# Patient Record
Sex: FEMALE | Race: WHITE | HISPANIC OR LATINO | Employment: FULL TIME | URBAN - METROPOLITAN AREA
[De-identification: names, ages, dates, MRNs, and addresses within clinical notes are randomized per-mention and may not be internally consistent; named-entity substitution may affect disease eponyms.]

---
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
## 2020-01-18 ENCOUNTER — HOSPITAL ENCOUNTER (EMERGENCY)
Facility: HOSPITAL | Age: 20
Discharge: HOME | End: 2020-01-18
Attending: EMERGENCY MEDICINE

## 2020-01-18 VITALS
WEIGHT: 148 LBS | OXYGEN SATURATION: 100 % | HEART RATE: 97 BPM | HEIGHT: 61 IN | DIASTOLIC BLOOD PRESSURE: 89 MMHG | SYSTOLIC BLOOD PRESSURE: 149 MMHG | RESPIRATION RATE: 18 BRPM | BODY MASS INDEX: 27.94 KG/M2 | TEMPERATURE: 98.7 F

## 2020-01-18 DIAGNOSIS — J06.9 URI (UPPER RESPIRATORY INFECTION): Primary | ICD-10-CM

## 2020-01-18 PROCEDURE — 99282 EMERGENCY DEPT VISIT SF MDM: CPT

## 2020-01-18 PROCEDURE — 99282 EMERGENCY DEPT VISIT SF MDM: CPT | Performed by: EMERGENCY MEDICINE

## 2020-01-18 RX ADMIN — DEXAMETHASONE SODIUM PHOSPHATE 10 MG: 10 INJECTION, SOLUTION INTRAMUSCULAR; INTRAVENOUS at 22:32

## 2020-01-19 NOTE — ED ATTENDING ATTESTATION
1/18/2020  IEileen DO, saw and evaluated the patient  I have discussed the patient with the resident/non-physician practitioner and agree with the resident's/non-physician practitioner's findings, Plan of Care, and MDM as documented in the resident's/non-physician practitioner's note, except where noted  All available labs and Radiology studies were reviewed  I was present for key portions of any procedure(s) performed by the resident/non-physician practitioner and I was immediately available to provide assistance  At this point I agree with the current assessment done in the Emergency Department  I have conducted an independent evaluation of this patient a history and physical is as follows:    80-year-old female presents with sore throat, cough, chest congestion, runny nose  No fevers or chills, denies shortness of breath  On exam-no acute distress, appears nontoxic, heart regular, no respiratory distress, mild pharyngeal erythema without tonsillar swelling  Plan-symptomatic treatment with Flonase, Decadron, Sudafed  Given return precautions including return if difficulty swallowing or other concerns      ED Course         Critical Care Time  Procedures

## 2020-01-19 NOTE — ED PROVIDER NOTES
History  Chief Complaint   Patient presents with    Sore Throat     sore throat, swollen glands     77-year-old female with no previous medical history, nonsmoker presenting emergency department with URI like symptoms for the last 4 days including cough, rhinorrhea, nasal congestion, chest congestion  No fevers, chills, shortness of breath, nausea, vomiting, diarrhea, constipation, vaginal discharge, muffled voice  None       History reviewed  No pertinent past medical history  History reviewed  No pertinent surgical history  History reviewed  No pertinent family history  I have reviewed and agree with the history as documented  Social History     Tobacco Use    Smoking status: Never Smoker    Smokeless tobacco: Never Used   Substance Use Topics    Alcohol use: Never     Frequency: Never    Drug use: Never        Review of Systems   HENT: Positive for congestion and sinus pressure  Respiratory: Positive for cough  Negative for shortness of breath  All other systems reviewed and are negative  Physical Exam  ED Triage Vitals [01/18/20 2146]   Temperature Pulse Respirations Blood Pressure SpO2   98 7 °F (37 1 °C) 97 18 149/89 100 %      Temp Source Heart Rate Source Patient Position - Orthostatic VS BP Location FiO2 (%)   Oral Monitor Lying Right arm --      Pain Score       8             Orthostatic Vital Signs  Vitals:    01/18/20 2146   BP: 149/89   Pulse: 97   Patient Position - Orthostatic VS: Lying       Physical Exam   Constitutional: She appears well-developed and well-nourished  No distress  HENT:   Head: Normocephalic and atraumatic  Right Ear: External ear normal    Left Ear: External ear normal    Eyes: Conjunctivae and EOM are normal    Neck: No JVD present  No tracheal deviation present  Cardiovascular: Normal rate, regular rhythm, normal heart sounds and intact distal pulses  No murmur heard  Pulmonary/Chest: Breath sounds normal  No stridor   No respiratory distress  She has no wheezes  She has no rales  Abdominal: Soft  Bowel sounds are normal  She exhibits no distension and no mass  There is no tenderness  There is no rebound and no guarding  Genitourinary:   Genitourinary Comments: Deferred   Musculoskeletal: She exhibits no edema, tenderness or deformity  Neurological: She exhibits normal muscle tone  Coordination normal    Skin: Skin is warm and dry  Capillary refill takes less than 2 seconds  No rash noted  She is not diaphoretic  No erythema  No pallor  Psychiatric: She has a normal mood and affect  Her behavior is normal  Judgment and thought content normal    Nursing note and vitals reviewed  ED Medications  Medications   dexamethasone 10 mg/mL oral liquid 10 mg 1 mL (10 mg Oral Given 1/18/20 2232)       Diagnostic Studies  Results Reviewed     None                 No orders to display         Procedures  Procedures      ED Course                               MDM  Number of Diagnoses or Management Options  URI (upper respiratory infection): new and does not require workup  Diagnosis management comments: 72-year-old female presenting emergency department with URI like symptoms  No erythema, tonsillar exudates, fever to suggest strep pharyngitis  No uvular deviation to suggest PTA  Patient likely has a URI  Patient be given 10 mg of Decadron to decrease her sore throat  Patient to treat her symptoms symptomatically  Patient given return precautions for shortness of breath, fevers  Patient expressed understanding with these return precautions      Risk of Complications, Morbidity, and/or Mortality  Presenting problems: minimal  Diagnostic procedures: minimal  Management options: minimal    Patient Progress  Patient progress: stable        Disposition  Final diagnoses:   URI (upper respiratory infection)     Time reflects when diagnosis was documented in both MDM as applicable and the Disposition within this note     Time User Action Codes Description Comment    1/18/2020 10:15 PM Froilan Estrella Add [J06 9] URI (upper respiratory infection)       ED Disposition     ED Disposition Condition Date/Time Comment    Discharge Stable Sat Jan 18, 2020 10:15 PM Griselda Romero discharge to home/self care  Follow-up Information     Follow up With Specialties Details Why Contact Info Additional [address] Emergency Department Emergency Medicine  If symptoms worsen [address]  [phone]  ED, [address] [place], [place], [address]   [phone]          Patient's Medications    No medications on file     No discharge procedures on file  ED Provider  Attending physically available and evaluated Liz Tasha LEVIN managed the patient along with the ED Attending      Electronically Signed by         Nicki Klein DO  01/18/20 5348

## 2020-01-19 NOTE — DISCHARGE INSTRUCTIONS
Please use Sudafed during the day for your cough and sinus congestion as needed  Use honey for your sore throat  Use Flonase for your sinus congestion  Use naproxen sodium if you have any body aches

## 2020-01-20 ENCOUNTER — HOSPITAL ENCOUNTER (EMERGENCY)
Facility: HOSPITAL | Age: 20
Discharge: HOME | End: 2020-01-21
Attending: EMERGENCY MEDICINE

## 2020-01-20 VITALS
WEIGHT: 154.98 LBS | RESPIRATION RATE: 18 BRPM | BODY MASS INDEX: 29.26 KG/M2 | TEMPERATURE: 99.1 F | SYSTOLIC BLOOD PRESSURE: 133 MMHG | DIASTOLIC BLOOD PRESSURE: 80 MMHG | HEART RATE: 105 BPM | OXYGEN SATURATION: 98 % | HEIGHT: 61 IN

## 2020-01-20 DIAGNOSIS — J02.9 PHARYNGITIS: Primary | ICD-10-CM

## 2020-01-20 DIAGNOSIS — J06.9 URI (UPPER RESPIRATORY INFECTION): ICD-10-CM

## 2020-01-20 PROCEDURE — 99284 EMERGENCY DEPT VISIT MOD MDM: CPT | Performed by: EMERGENCY MEDICINE

## 2020-01-20 PROCEDURE — 99282 EMERGENCY DEPT VISIT SF MDM: CPT

## 2020-01-20 PROCEDURE — 96372 THER/PROPH/DIAG INJ SC/IM: CPT

## 2020-01-20 RX ORDER — KETOROLAC TROMETHAMINE 30 MG/ML
15 INJECTION, SOLUTION INTRAMUSCULAR; INTRAVENOUS ONCE
Status: COMPLETED | OUTPATIENT
Start: 2020-01-20 | End: 2020-01-20

## 2020-01-20 RX ORDER — ACETAMINOPHEN 325 MG/1
975 TABLET ORAL ONCE
Status: COMPLETED | OUTPATIENT
Start: 2020-01-20 | End: 2020-01-20

## 2020-01-20 RX ADMIN — KETOROLAC TROMETHAMINE 15 MG: 30 INJECTION, SOLUTION INTRAMUSCULAR at 23:44

## 2020-01-20 RX ADMIN — ACETAMINOPHEN 975 MG: 325 TABLET ORAL at 23:44

## 2020-01-20 RX ADMIN — DEXAMETHASONE SODIUM PHOSPHATE 10 MG: 10 INJECTION, SOLUTION INTRAMUSCULAR; INTRAVENOUS at 23:44

## 2020-01-21 NOTE — ED PROVIDER NOTES
History  Chief Complaint   Patient presents with    Sore Throat     Pt reports sore throat for four days as well as a cough      17-year-old female otherwise healthy presents emergency department for sore throat and cough  Patient says that symptoms started yesterday and she has also had subjective fevers, bilateral paraspinous neck pain, rhinorrhea  Her boyfriend is ill with similar symptoms  She has not had any shortness of breath, chest pain, abdominal pain, nausea, vomiting, diarrhea, neck stiffness, focal neurological symptoms, urinary symptoms, any other symptoms or complaints  None       History reviewed  No pertinent past medical history  History reviewed  No pertinent surgical history  History reviewed  No pertinent family history  I have reviewed and agree with the history as documented  Social History     Tobacco Use    Smoking status: Never Smoker    Smokeless tobacco: Never Used   Substance Use Topics    Alcohol use: Never     Frequency: Never    Drug use: Never        Review of Systems   Constitutional: Positive for chills  HENT: Positive for rhinorrhea and sore throat  Eyes: Negative  Respiratory: Positive for cough  Negative for shortness of breath  Cardiovascular: Negative  Negative for chest pain and leg swelling  Gastrointestinal: Negative  Negative for abdominal pain, diarrhea, nausea and vomiting  Genitourinary: Negative  Negative for dysuria, flank pain and frequency  Musculoskeletal: Negative  Negative for back pain and neck pain  Skin: Negative  Negative for rash  Neurological: Negative  Negative for light-headedness and headaches  All other systems reviewed and are negative        Physical Exam  ED Triage Vitals [01/20/20 2314]   Temperature Pulse Respirations Blood Pressure SpO2   99 1 °F (37 3 °C) 105 18 133/80 98 %      Temp Source Heart Rate Source Patient Position - Orthostatic VS BP Location FiO2 (%)   Oral Monitor Sitting Left arm --      Pain Score       Worst Possible Pain             Orthostatic Vital Signs  Vitals:    01/20/20 2314   BP: 133/80   Pulse: 105   Patient Position - Orthostatic VS: Sitting       Physical Exam   Constitutional: She is oriented to person, place, and time  She appears well-developed and well-nourished  No distress  HENT:   Head: Normocephalic and atraumatic  Mouth/Throat: Uvula is midline and mucous membranes are normal  No uvula swelling  Posterior oropharyngeal erythema (Mild) present  No posterior oropharyngeal edema or tonsillar abscesses  Eyes: EOM are normal    Neck: Normal range of motion  Neck supple  Cardiovascular: Normal rate, regular rhythm, normal heart sounds and intact distal pulses  Exam reveals no gallop and no friction rub  No murmur heard  Pulmonary/Chest: Effort normal and breath sounds normal  No respiratory distress  She has no wheezes  She has no rales  Abdominal: Soft  There is no tenderness  There is no rebound and no guarding  Musculoskeletal: She exhibits no edema or tenderness  Neurological: She is alert and oriented to person, place, and time  No cranial nerve deficit  Clear fluent speech   Skin: Skin is warm and dry  Capillary refill takes less than 2 seconds  Psychiatric: She has a normal mood and affect  Nursing note and vitals reviewed        ED Medications  Medications   ketorolac (TORADOL) injection 15 mg (15 mg Intramuscular Given 1/20/20 2344)   dexamethasone 10 mg/mL oral liquid 10 mg 1 mL (10 mg Oral Given 1/20/20 2344)   acetaminophen (TYLENOL) tablet 975 mg (975 mg Oral Given 1/20/20 2344)       Diagnostic Studies  Results Reviewed     None                 No orders to display         Procedures  Procedures      ED Course                               MDM  Number of Diagnoses or Management Options  Pharyngitis:   URI (upper respiratory infection):   Diagnosis management comments: 57-year-old female otherwise healthy presents emergency department for sore throat and cough  No signs of strep throat  Gave Decadron, Tylenol, Toradol for pain control  Advised close primary care follow-up  Strict ED return precautions provided should symptoms worsen and patient can otherwise follow up outpatient  Patient expresses an understanding and agreement with the plan and remains in good condition for discharge  Disposition  Final diagnoses:   Pharyngitis   URI (upper respiratory infection)     Time reflects when diagnosis was documented in both MDM as applicable and the Disposition within this note     Time User Action Codes Description Comment    1/20/2020 11:46 PM Rachell Patino Add [J02 9] Pharyngitis     1/20/2020 11:46 PM Rachell Patino Add [J06 9] URI (upper respiratory infection)       ED Disposition     ED Disposition Condition Date/Time Comment    Discharge Stable Mon Jan 20, 2020 11:46 PM Vasile Ferguson discharge to home/self care  Follow-up Information     Follow up With Specialties Details Why Contact Info Additional [address] Emergency Department Emergency Medicine Go to  If symptoms worsen [address]  [phone]  ED, [address] [place] [address], [address] [place] [address] Avenue  Call in 1 day To make an appointment [phone]             Patient's Medications    No medications on file     No discharge procedures on file  ED Provider  Attending physically available and evaluated Vasile Ferguson I managed the patient along with the ED Attending      Electronically Signed by         Del Lindsey MD  01/21/20 0010

## 2020-01-26 NOTE — ED ATTENDING ATTESTATION
1/20/2020  IKoffi MD, saw and evaluated the patient  I have discussed the patient with the resident/non-physician practitioner and agree with the resident's/non-physician practitioner's findings, Plan of Care, and MDM as documented in the resident's/non-physician practitioner's note, except where noted  All available labs and Radiology studies were reviewed  I was present for key portions of any procedure(s) performed by the resident/non-physician practitioner and I was immediately available to provide assistance  At this point I agree with the current assessment done in the Emergency Department  I have conducted an independent evaluation of this patient a history and physical is as follows:    27-year-old female presenting with sore throat and URI symptoms  Awake and alert no acute distress  Head atraumatic normocephalic  Neck is supple with no meningismus  Oropharynx injected with no exudate  Heart regular rate and rhythm, no murmurs rubs or gallops  Lungs clear to auscultation bilaterally  Will treat symptomatically and instruct return precautions      ED Course         Critical Care Time  Procedures

## 2020-11-13 ENCOUNTER — OFFICE VISIT (OUTPATIENT)
Dept: URGENT CARE | Age: 20
End: 2020-11-13
Payer: OTHER GOVERNMENT

## 2020-11-13 VITALS
OXYGEN SATURATION: 100 % | WEIGHT: 155 LBS | RESPIRATION RATE: 16 BRPM | HEART RATE: 102 BPM | BODY MASS INDEX: 29.27 KG/M2 | TEMPERATURE: 97.1 F | HEIGHT: 61 IN

## 2020-11-13 DIAGNOSIS — Z20.822 ENCOUNTER FOR SCREENING LABORATORY TESTING FOR COVID-19 VIRUS: Primary | ICD-10-CM

## 2020-11-13 PROCEDURE — U0003 INFECTIOUS AGENT DETECTION BY NUCLEIC ACID (DNA OR RNA); SEVERE ACUTE RESPIRATORY SYNDROME CORONAVIRUS 2 (SARS-COV-2) (CORONAVIRUS DISEASE [COVID-19]), AMPLIFIED PROBE TECHNIQUE, MAKING USE OF HIGH THROUGHPUT TECHNOLOGIES AS DESCRIBED BY CMS-2020-01-R: HCPCS | Performed by: PREVENTIVE MEDICINE

## 2020-11-13 PROCEDURE — G0382 LEV 3 HOSP TYPE B ED VISIT: HCPCS | Performed by: PREVENTIVE MEDICINE

## 2020-11-15 LAB — SARS-COV-2 RNA SPEC QL NAA+PROBE: NOT DETECTED

## 2020-11-16 ENCOUNTER — TELEPHONE (OUTPATIENT)
Dept: URGENT CARE | Facility: CLINIC | Age: 20
End: 2020-11-16

## 2021-05-19 ENCOUNTER — APPOINTMENT (EMERGENCY)
Dept: RADIOLOGY | Facility: HOSPITAL | Age: 21
End: 2021-05-19
Payer: COMMERCIAL

## 2021-05-19 ENCOUNTER — HOSPITAL ENCOUNTER (EMERGENCY)
Facility: HOSPITAL | Age: 21
Discharge: HOME | End: 2021-05-19
Attending: EMERGENCY MEDICINE
Payer: COMMERCIAL

## 2021-05-19 VITALS
TEMPERATURE: 98.4 F | RESPIRATION RATE: 16 BRPM | OXYGEN SATURATION: 100 % | DIASTOLIC BLOOD PRESSURE: 93 MMHG | HEART RATE: 87 BPM | SYSTOLIC BLOOD PRESSURE: 138 MMHG

## 2021-05-19 DIAGNOSIS — G43.909 MIGRAINE: Primary | ICD-10-CM

## 2021-05-19 LAB
ALBUMIN SERPL BCP-MCNC: 4.2 G/DL (ref 3.5–5)
ALP SERPL-CCNC: 80 U/L (ref 46–116)
ALT SERPL W P-5'-P-CCNC: 20 U/L (ref 12–78)
ANION GAP SERPL CALCULATED.3IONS-SCNC: 6 MMOL/L (ref 4–13)
AST SERPL W P-5'-P-CCNC: 19 U/L (ref 5–45)
BASOPHILS # BLD AUTO: 0.04 THOUSANDS/ΜL (ref 0–0.1)
BASOPHILS NFR BLD AUTO: 1 % (ref 0–1)
BILIRUB SERPL-MCNC: 0.38 MG/DL (ref 0.2–1)
BILIRUB UR QL STRIP: NEGATIVE
BUN SERPL-MCNC: 12 MG/DL (ref 5–25)
CALCIUM SERPL-MCNC: 9.4 MG/DL (ref 8.3–10.1)
CHLORIDE SERPL-SCNC: 103 MMOL/L (ref 100–108)
CLARITY UR: CLEAR
CO2 SERPL-SCNC: 27 MMOL/L (ref 21–32)
COLOR UR: YELLOW
COLOR, POC: NORMAL
CREAT SERPL-MCNC: 0.63 MG/DL (ref 0.6–1.3)
EOSINOPHIL # BLD AUTO: 0.1 THOUSAND/ΜL (ref 0–0.61)
EOSINOPHIL NFR BLD AUTO: 2 % (ref 0–6)
ERYTHROCYTE [DISTWIDTH] IN BLOOD BY AUTOMATED COUNT: 13.3 % (ref 11.6–15.1)
EXT PREG TEST URINE: NEGATIVE
EXT. CONTROL ED NAV: NORMAL
GFR SERPL CREATININE-BSD FRML MDRD: 129 ML/MIN/1.73SQ M
GLUCOSE SERPL-MCNC: 85 MG/DL (ref 65–140)
GLUCOSE UR STRIP-MCNC: NEGATIVE MG/DL
HCT VFR BLD AUTO: 45.3 % (ref 34.8–46.1)
HGB BLD-MCNC: 14.4 G/DL (ref 11.5–15.4)
HGB UR QL STRIP.AUTO: NEGATIVE
IMM GRANULOCYTES # BLD AUTO: 0.01 THOUSAND/UL (ref 0–0.2)
IMM GRANULOCYTES NFR BLD AUTO: 0 % (ref 0–2)
KETONES UR STRIP-MCNC: NEGATIVE MG/DL
LEUKOCYTE ESTERASE UR QL STRIP: NEGATIVE
LYMPHOCYTES # BLD AUTO: 1.78 THOUSANDS/ΜL (ref 0.6–4.47)
LYMPHOCYTES NFR BLD AUTO: 27 % (ref 14–44)
MCH RBC QN AUTO: 27.5 PG (ref 26.8–34.3)
MCHC RBC AUTO-ENTMCNC: 31.8 G/DL (ref 31.4–37.4)
MCV RBC AUTO: 87 FL (ref 82–98)
MONOCYTES # BLD AUTO: 0.45 THOUSAND/ΜL (ref 0.17–1.22)
MONOCYTES NFR BLD AUTO: 7 % (ref 4–12)
NEUTROPHILS # BLD AUTO: 4.26 THOUSANDS/ΜL (ref 1.85–7.62)
NEUTS SEG NFR BLD AUTO: 63 % (ref 43–75)
NITRITE UR QL STRIP: NEGATIVE
NRBC BLD AUTO-RTO: 0 /100 WBCS
PH UR STRIP.AUTO: 6 [PH] (ref 4.5–8)
PLATELET # BLD AUTO: 247 THOUSANDS/UL (ref 149–390)
PMV BLD AUTO: 12.1 FL (ref 8.9–12.7)
POTASSIUM SERPL-SCNC: 3.8 MMOL/L (ref 3.5–5.3)
PROT SERPL-MCNC: 8.9 G/DL (ref 6.4–8.2)
PROT UR STRIP-MCNC: NEGATIVE MG/DL
RBC # BLD AUTO: 5.23 MILLION/UL (ref 3.81–5.12)
SODIUM SERPL-SCNC: 136 MMOL/L (ref 136–145)
SP GR UR STRIP.AUTO: 1.02 (ref 1–1.03)
UROBILINOGEN UR QL STRIP.AUTO: 0.2 E.U./DL
WBC # BLD AUTO: 6.64 THOUSAND/UL (ref 4.31–10.16)

## 2021-05-19 PROCEDURE — 70450 CT HEAD/BRAIN W/O DYE: CPT

## 2021-05-19 PROCEDURE — 85025 COMPLETE CBC W/AUTO DIFF WBC: CPT | Performed by: PHYSICIAN ASSISTANT

## 2021-05-19 PROCEDURE — 80053 COMPREHEN METABOLIC PANEL: CPT | Performed by: PHYSICIAN ASSISTANT

## 2021-05-19 PROCEDURE — 99284 EMERGENCY DEPT VISIT MOD MDM: CPT

## 2021-05-19 PROCEDURE — 99284 EMERGENCY DEPT VISIT MOD MDM: CPT | Performed by: PHYSICIAN ASSISTANT

## 2021-05-19 PROCEDURE — 96361 HYDRATE IV INFUSION ADD-ON: CPT

## 2021-05-19 PROCEDURE — 81025 URINE PREGNANCY TEST: CPT | Performed by: PHYSICIAN ASSISTANT

## 2021-05-19 PROCEDURE — 36415 COLL VENOUS BLD VENIPUNCTURE: CPT | Performed by: PHYSICIAN ASSISTANT

## 2021-05-19 PROCEDURE — 96375 TX/PRO/DX INJ NEW DRUG ADDON: CPT

## 2021-05-19 PROCEDURE — 81003 URINALYSIS AUTO W/O SCOPE: CPT

## 2021-05-19 PROCEDURE — 96374 THER/PROPH/DIAG INJ IV PUSH: CPT

## 2021-05-19 RX ORDER — DIPHENHYDRAMINE HYDROCHLORIDE 50 MG/ML
25 INJECTION INTRAMUSCULAR; INTRAVENOUS ONCE
Status: COMPLETED | OUTPATIENT
Start: 2021-05-19 | End: 2021-05-19

## 2021-05-19 RX ORDER — METOCLOPRAMIDE HYDROCHLORIDE 5 MG/ML
10 INJECTION INTRAMUSCULAR; INTRAVENOUS ONCE
Status: COMPLETED | OUTPATIENT
Start: 2021-05-19 | End: 2021-05-19

## 2021-05-19 RX ORDER — ACETAMINOPHEN 325 MG/1
650 TABLET ORAL ONCE
Status: COMPLETED | OUTPATIENT
Start: 2021-05-19 | End: 2021-05-19

## 2021-05-19 RX ADMIN — METOCLOPRAMIDE 10 MG: 5 INJECTION, SOLUTION INTRAMUSCULAR; INTRAVENOUS at 11:13

## 2021-05-19 RX ADMIN — SODIUM CHLORIDE 1000 ML: 0.9 INJECTION, SOLUTION INTRAVENOUS at 11:13

## 2021-05-19 RX ADMIN — ACETAMINOPHEN 650 MG: 325 TABLET, FILM COATED ORAL at 11:13

## 2021-05-19 RX ADMIN — DIPHENHYDRAMINE HYDROCHLORIDE 25 MG: 50 INJECTION, SOLUTION INTRAMUSCULAR; INTRAVENOUS at 11:12

## 2021-05-19 NOTE — ED PROVIDER NOTES
History  Chief Complaint   Patient presents with    Headache - Recurrent or Known Dx Migraines     Pt presents with migraine HA since waking up this morning  Also c/o photophobia and nausea  Patient presents to the ER for evaluation of a migraine  Patient states that when she woke up this morning she had a significant left-sided migraine  States that she has a history of migraines and gets them frequently however this 1 is different and more severe than her normal migraines  States that she took 400 mg ibuprofen around 4 hours PTA with no relief  States the headache is worse with lights and sounds  Denies any head trauma  States that she has never had imaging on her head and does not follow up with anybody for her migraines  Patient denies any known sick contacts  Denies any fevers, dizziness, syncope, chest pain, shortness of breath, cough, nausea, vomiting, diarrhea, or any other concerning symptoms  None       History reviewed  No pertinent past medical history  History reviewed  No pertinent surgical history  History reviewed  No pertinent family history  I have reviewed and agree with the history as documented  E-Cigarette/Vaping     E-Cigarette/Vaping Substances     Social History     Tobacco Use    Smoking status: Never Smoker    Smokeless tobacco: Never Used   Substance Use Topics    Alcohol use: Never     Frequency: Never    Drug use: Never       Review of Systems   Constitutional: Negative for fever  HENT: Negative for congestion, rhinorrhea and sore throat  Eyes: Positive for photophobia  Respiratory: Negative for shortness of breath  Cardiovascular: Negative for chest pain  Gastrointestinal: Negative for abdominal pain, nausea and vomiting  Genitourinary: Negative for dysuria  Musculoskeletal: Negative for back pain and neck pain  Skin: Negative for rash  Neurological: Positive for headaches  Negative for weakness and numbness     All other systems reviewed and are negative  Physical Exam  Physical Exam  Constitutional:       Appearance: She is well-developed  HENT:      Head: Normocephalic and atraumatic  Nose: Nose normal    Eyes:      Extraocular Movements: Extraocular movements intact  Conjunctiva/sclera: Conjunctivae normal       Pupils: Pupils are equal, round, and reactive to light  Comments: No nystagmus   Neck:      Musculoskeletal: Normal range of motion  No neck rigidity  Cardiovascular:      Rate and Rhythm: Normal rate  Pulmonary:      Effort: Pulmonary effort is normal    Abdominal:      Palpations: Abdomen is soft  Tenderness: There is no abdominal tenderness  Musculoskeletal: Normal range of motion  Comments: 5/5 strength of upper and lower extremities bilaterally   Skin:     General: Skin is warm  Capillary Refill: Capillary refill takes less than 2 seconds  Neurological:      Mental Status: She is alert and oriented to person, place, and time  Sensory: No sensory deficit  Comments: GCS:  15/15  Normal finger-to-nose  Psychiatric:         Mood and Affect: Mood normal          Behavior: Behavior normal          Thought Content:  Thought content normal          Judgment: Judgment normal          Vital Signs  ED Triage Vitals   Temperature Pulse Respirations Blood Pressure SpO2   05/19/21 1029 05/19/21 1029 05/19/21 1029 05/19/21 1029 05/19/21 1029   98 4 °F (36 9 °C) 87 16 138/93 100 %      Temp Source Heart Rate Source Patient Position - Orthostatic VS BP Location FiO2 (%)   05/19/21 1029 -- -- -- --   Oral          Pain Score       05/19/21 1106       Worst Possible Pain           Vitals:    05/19/21 1029   BP: 138/93   Pulse: 87         Visual Acuity  Visual Acuity      Most Recent Value   L Pupil Size (mm)  3   R Pupil Size (mm)  3          ED Medications  Medications   sodium chloride 0 9 % bolus 1,000 mL (0 mL Intravenous Stopped 5/19/21 1230)   acetaminophen (TYLENOL) tablet 650 mg (650 mg Oral Given 5/19/21 1113)   metoclopramide (REGLAN) injection 10 mg (10 mg Intravenous Given 5/19/21 1113)   diphenhydrAMINE (BENADRYL) injection 25 mg (25 mg Intravenous Given 5/19/21 1112)       Diagnostic Studies  Results Reviewed     Procedure Component Value Units Date/Time    Comprehensive metabolic panel [[id number]]  (Abnormal) Collected: 05/19/21 1112    Lab Status: Final result Specimen: Blood from Arm, Right Updated: 05/19/21 1208     Sodium 136 mmol/L      Potassium 3 8 mmol/L      Chloride 103 mmol/L      CO2 27 mmol/L      ANION GAP 6 mmol/L      BUN 12 mg/dL      Creatinine 0 63 mg/dL      Glucose 85 mg/dL      Calcium 9 4 mg/dL      AST 19 U/L      ALT 20 U/L      Alkaline Phosphatase 80 U/L      Total Protein 8 9 g/dL      Albumin 4 2 g/dL      Total Bilirubin 0 38 mg/dL      eGFR 129 ml/min/1 73sq m     Narrative:      National Kidney Disease Foundation guidelines for Chronic Kidney Disease (CKD):     Stage 1 with normal or high GFR (GFR > 90 mL/min/1 73 square meters)    Stage 2 Mild CKD (GFR = 60-89 mL/min/1 73 square meters)    Stage 3A Moderate CKD (GFR = 45-59 mL/min/1 73 square meters)    Stage 3B Moderate CKD (GFR = 30-44 mL/min/1 73 square meters)    Stage 4 Severe CKD (GFR = 15-29 mL/min/1 73 square meters)    Stage 5 End Stage CKD (GFR <15 mL/min/1 73 square meters)  Note: GFR calculation is accurate only with a steady state creatinine    CBC and differential [[id number]]  (Abnormal) Collected: 05/19/21 1112    Lab Status: Final result Specimen: Blood from Arm, Right Updated: 05/19/21 1122     WBC 6 64 Thousand/uL      RBC 5 23 Million/uL      Hemoglobin 14 4 g/dL      Hematocrit 45 3 %      MCV 87 fL      MCH 27 5 pg      MCHC 31 8 g/dL      RDW 13 3 %      MPV 12 1 fL      Platelets 080 Thousands/uL      nRBC 0 /100 WBCs      Neutrophils Relative 63 %      Immat GRANS % 0 %      Lymphocytes Relative 27 %      Monocytes Relative 7 %      Eosinophils Relative 2 %      Basophils Relative 1 %      Neutrophils Absolute 4 26 Thousands/µL      Immature Grans Absolute 0 01 Thousand/uL      Lymphocytes Absolute 1 78 Thousands/µL      Monocytes Absolute 0 45 Thousand/µL      Eosinophils Absolute 0 10 Thousand/µL      Basophils Absolute 0 04 Thousands/µL     Urine Macroscopic, POC [[id number]] Collected: 05/19/21 1115    Lab Status: Final result Specimen: Urine Updated: 05/19/21 1118     Color, UA Yellow     Clarity, UA Clear     pH, UA 6 0     Leukocytes, UA Negative     Nitrite, UA Negative     Protein, UA Negative mg/dl      Glucose, UA Negative mg/dl      Ketones, UA Negative mg/dl      Urobilinogen, UA 0 2 E U /dl      Bilirubin, UA Negative     Blood, UA Negative     Specific Gravity, UA 1 020    Narrative:      CLINITEK RESULT    POCT urinalysis dipstick [[id number]]  (Normal) Resulted: 05/19/21 1117    Lab Status: Final result Specimen: Urine Updated: 05/19/21 1118     Color, UA -    POCT pregnancy, urine [[id number]]  (Normal) Resulted: 05/19/21 1117    Lab Status: Final result Updated: 05/19/21 1117     EXT PREG TEST UR (Ref: Negative) Negative     Control Valid                 CT head without contrast   Final Result by Tung Oropeza MD (05/19 1206)      No acute intracranial abnormality  Workstation performed: [id number]                    Procedures  Procedures         ED Course  ED Course as of May 19 1543   Wed May 19, 2021   1040 Blood Pressure: 138/93   1040 Temperature: 98 4 °F (36 9 °C)   1040 Pulse: 87   1040 Respirations: 16   1040 SpO2: 100 %   1127 WBC: 6 64   1128 Hemoglobin: 14 4   1128 PREGNANCY TEST URINE: Negative   1128 Leukocytes, UA: Negative   1128 Nitrite, UA: Negative   1128 Blood, UA: Negative   1208 IMPRESSION:     No acute intracranial abnormality  CT head without contrast   1208 Creatinine: 0 63   1227 Patient notes resolution of headache with medication in the ER  Resting comfortably on re-examination  MDM     Labs and imaging grossly benign  Patient with resolution of symptoms with medication in the ER  Neurologically intact throughout entire ER stay  Discussed symptomatic treatment and strict return instructions  Patient in no acute distress throughout ER stay  Vitals stable and reassuring  Patient stable for discharge at this time  Reviewed plan with patient/family  Reviewed red flag symptoms and strict return instructions  Patient/family voiced understanding and agreement to plan  Patient/family had opportunity to ask questions and all questions were answered at bedside  Disposition  Final diagnoses:   Migraine     Time reflects when diagnosis was documented in both MDM as applicable and the Disposition within this note     Time User Action Codes Description Comment    5/19/2021 12:28 PM Ann Shen [I96 [id number]] Migraine       ED Disposition     ED Disposition Condition Date/Time Comment    Discharge Stable Wed May 19, 2021 12:28 PM Vasile Ferguson discharge to home/self care  Follow-up Information     Follow up With Specialties Details Why Contact Info Additional [address] Emergency Department Emergency Medicine  If symptoms worsen [address] [place] [address] Emergency Department, [address], [place], [facility] [address]    [address]        Neurology [facility] Neurology  As discussed follow up for your frequent migraines [facility] [address] [facility]  [phone] Neurology [facility], [address] [place] [address], [address] [place] [address]          There are no discharge medications for this patient  No discharge procedures on file      PDMP Review     None          ED Provider  Electronically Signed by           Blanco Frey Kathy Bhatti PA-C  05/19/21 1546

## 2021-05-19 NOTE — Clinical Note
Najma Cary was seen and treated in our emergency department on 5/19/2021  Diagnosis:     Crystal  is off the rest of the shift today  She may return on this date: If you have any questions or concerns, please don't hesitate to call        Yoshi Marie PA-C    ______________________________           _______________          _______________  Hospital Representative                              Date                                Time

## 2021-05-19 NOTE — DISCHARGE INSTRUCTIONS
Return to the ER with any new or worsening of symptoms such as but not limited to increased pain, fevers, dizziness, change in mental status, vomiting, chest pain, or any other concerning symptoms    Thank you for allowing us to be part of your care today

## 2021-07-19 ENCOUNTER — HOSPITAL ENCOUNTER (EMERGENCY)
Facility: HOSPITAL | Age: 21
Discharge: HOME | End: 2021-07-19
Attending: EMERGENCY MEDICINE | Admitting: EMERGENCY MEDICINE
Payer: COMMERCIAL

## 2021-07-19 ENCOUNTER — APPOINTMENT (EMERGENCY)
Dept: RADIOLOGY | Facility: HOSPITAL | Age: 21
End: 2021-07-19
Payer: COMMERCIAL

## 2021-07-19 VITALS
DIASTOLIC BLOOD PRESSURE: 80 MMHG | OXYGEN SATURATION: 98 % | WEIGHT: 177.25 LBS | HEART RATE: 85 BPM | SYSTOLIC BLOOD PRESSURE: 130 MMHG | HEIGHT: 61 IN | TEMPERATURE: 98.6 F | BODY MASS INDEX: 33.47 KG/M2 | RESPIRATION RATE: 16 BRPM

## 2021-07-19 DIAGNOSIS — R10.11 RIGHT UPPER QUADRANT ABDOMINAL PAIN: Primary | ICD-10-CM

## 2021-07-19 LAB
ALBUMIN SERPL BCP-MCNC: 3.8 G/DL (ref 3.5–5)
ALP SERPL-CCNC: 70 U/L (ref 46–116)
ALT SERPL W P-5'-P-CCNC: 32 U/L (ref 12–78)
ANION GAP SERPL CALCULATED.3IONS-SCNC: 5 MMOL/L (ref 4–13)
AST SERPL W P-5'-P-CCNC: 18 U/L (ref 5–45)
BASOPHILS # BLD AUTO: 0.04 THOUSANDS/ΜL (ref 0–0.1)
BASOPHILS NFR BLD AUTO: 1 % (ref 0–1)
BILIRUB SERPL-MCNC: 0.28 MG/DL (ref 0.2–1)
BILIRUB UR QL STRIP: NEGATIVE
BILIRUB UR QL STRIP: NEGATIVE
BUN SERPL-MCNC: 11 MG/DL (ref 5–25)
CALCIUM SERPL-MCNC: 8.8 MG/DL (ref 8.3–10.1)
CHLORIDE SERPL-SCNC: 108 MMOL/L (ref 100–108)
CLARITY UR: CLEAR
CLARITY UR: CLEAR
CO2 SERPL-SCNC: 23 MMOL/L (ref 21–32)
COLOR UR: YELLOW
COLOR UR: YELLOW
CREAT SERPL-MCNC: 0.65 MG/DL (ref 0.6–1.3)
EOSINOPHIL # BLD AUTO: 0.04 THOUSAND/ΜL (ref 0–0.61)
EOSINOPHIL NFR BLD AUTO: 1 % (ref 0–6)
ERYTHROCYTE [DISTWIDTH] IN BLOOD BY AUTOMATED COUNT: 13.6 % (ref 11.6–15.1)
EXT PREG TEST URINE: NEGATIVE
EXT. CONTROL ED NAV: NORMAL
GFR SERPL CREATININE-BSD FRML MDRD: 127 ML/MIN/1.73SQ M
GLUCOSE SERPL-MCNC: 92 MG/DL (ref 65–140)
GLUCOSE UR STRIP-MCNC: NEGATIVE MG/DL
GLUCOSE UR STRIP-MCNC: NEGATIVE MG/DL
HCT VFR BLD AUTO: 41.6 % (ref 34.8–46.1)
HGB BLD-MCNC: 13.4 G/DL (ref 11.5–15.4)
HGB UR QL STRIP.AUTO: NEGATIVE
HGB UR QL STRIP.AUTO: NEGATIVE
IMM GRANULOCYTES # BLD AUTO: 0.02 THOUSAND/UL (ref 0–0.2)
IMM GRANULOCYTES NFR BLD AUTO: 0 % (ref 0–2)
KETONES UR STRIP-MCNC: NEGATIVE MG/DL
KETONES UR STRIP-MCNC: NEGATIVE MG/DL
LEUKOCYTE ESTERASE UR QL STRIP: NEGATIVE
LEUKOCYTE ESTERASE UR QL STRIP: NEGATIVE
LYMPHOCYTES # BLD AUTO: 1.95 THOUSANDS/ΜL (ref 0.6–4.47)
LYMPHOCYTES NFR BLD AUTO: 29 % (ref 14–44)
MCH RBC QN AUTO: 27.6 PG (ref 26.8–34.3)
MCHC RBC AUTO-ENTMCNC: 32.2 G/DL (ref 31.4–37.4)
MCV RBC AUTO: 86 FL (ref 82–98)
MONOCYTES # BLD AUTO: 0.52 THOUSAND/ΜL (ref 0.17–1.22)
MONOCYTES NFR BLD AUTO: 8 % (ref 4–12)
NEUTROPHILS # BLD AUTO: 4.09 THOUSANDS/ΜL (ref 1.85–7.62)
NEUTS SEG NFR BLD AUTO: 61 % (ref 43–75)
NITRITE UR QL STRIP: NEGATIVE
NITRITE UR QL STRIP: NEGATIVE
NRBC BLD AUTO-RTO: 0 /100 WBCS
PH UR STRIP.AUTO: 6 [PH]
PH UR STRIP.AUTO: 6 [PH] (ref 4.5–8)
PLATELET # BLD AUTO: 239 THOUSANDS/UL (ref 149–390)
PMV BLD AUTO: 11.3 FL (ref 8.9–12.7)
POTASSIUM SERPL-SCNC: 4 MMOL/L (ref 3.5–5.3)
PROT SERPL-MCNC: 8.2 G/DL (ref 6.4–8.2)
PROT UR STRIP-MCNC: NEGATIVE MG/DL
PROT UR STRIP-MCNC: NEGATIVE MG/DL
RBC # BLD AUTO: 4.86 MILLION/UL (ref 3.81–5.12)
SODIUM SERPL-SCNC: 136 MMOL/L (ref 136–145)
SP GR UR STRIP.AUTO: 1.02 (ref 1–1.03)
SP GR UR STRIP.AUTO: >=1.03 (ref 1–1.03)
UROBILINOGEN UR QL STRIP.AUTO: 0.2 E.U./DL
UROBILINOGEN UR QL STRIP.AUTO: 0.2 E.U./DL
WBC # BLD AUTO: 6.66 THOUSAND/UL (ref 4.31–10.16)

## 2021-07-19 PROCEDURE — 81025 URINE PREGNANCY TEST: CPT

## 2021-07-19 PROCEDURE — 76856 US EXAM PELVIC COMPLETE: CPT

## 2021-07-19 PROCEDURE — 81003 URINALYSIS AUTO W/O SCOPE: CPT

## 2021-07-19 PROCEDURE — 80053 COMPREHEN METABOLIC PANEL: CPT

## 2021-07-19 PROCEDURE — 76830 TRANSVAGINAL US NON-OB: CPT

## 2021-07-19 PROCEDURE — 99284 EMERGENCY DEPT VISIT MOD MDM: CPT

## 2021-07-19 PROCEDURE — 99284 EMERGENCY DEPT VISIT MOD MDM: CPT | Performed by: EMERGENCY MEDICINE

## 2021-07-19 PROCEDURE — 36415 COLL VENOUS BLD VENIPUNCTURE: CPT

## 2021-07-19 PROCEDURE — 85025 COMPLETE CBC W/AUTO DIFF WBC: CPT

## 2021-07-19 RX ORDER — DICYCLOMINE HCL 20 MG
20 TABLET ORAL 2 TIMES DAILY
Qty: 6 TABLET | Refills: 0 | Status: SHIPPED | OUTPATIENT
Start: 2021-07-19 | End: 2021-08-02

## 2021-07-19 RX ORDER — SUCRALFATE 1 G/1
1 TABLET ORAL ONCE
Status: COMPLETED | OUTPATIENT
Start: 2021-07-19 | End: 2021-07-19

## 2021-07-19 RX ADMIN — SUCRALFATE 1 G: 1 TABLET ORAL at 10:01

## 2021-07-19 NOTE — Clinical Note
Neha Field was seen and treated in our emergency department on 7/19/2021  Diagnosis:     Griselda  may return to work on return date  She may return on this date: 07/20/2021    Patient was evaluated in the emergency department at [facility]  She is cleared to return to work on the stated date  If you have any questions or concerns, please don't hesitate to call        Evonne May MD    ______________________________           _______________          _______________  Hospital Representative                              Date                                Time

## 2021-07-19 NOTE — ED ATTENDING ATTESTATION
7/19/2021  IAlyssa MD, saw and evaluated the patient  I have discussed the patient with the resident/non-physician practitioner and agree with the resident's/non-physician practitioner's findings, Plan of Care, and MDM as documented in the resident's/non-physician practitioner's note, except where noted  All available labs and Radiology studies were reviewed  I was present for key portions of any procedure(s) performed by the resident/non-physician practitioner and I was immediately available to provide assistance  At this point I agree with the current assessment done in the Emergency Department  I have conducted an independent evaluation of this patient a history and physical is as follows:  Pt has 2 weeks with r sided intermittent abd pain occurring one time daily worse with bending over Pt has been having nonbloody diarrhea no nv   No fevers no chills PE: alert heart reg lungs clear abd soft nondistended tenere LLq no gao's no [place] MDM: will check labs preg tests us pelvis  ED Course         Critical Care Time  Procedures

## 2021-07-19 NOTE — ED PROVIDER NOTES
History  Chief Complaint   Patient presents with    Headache     Patient reports a headache starting last night; states that over the last week or so she has also been having lower abdominal pain; denies nausea and vomiting or urine    Abdominal Pain     20-year-old  female with past history of UTIs and menstrual pain presents to the emergency department for right-sided abdominal pain x2 weeks  Patient describes the pain as acute onset, intermittent, worse when bending over, lasting approximately 15-20 minutes per episode, 1 episode per day, usually in the afternoon  Pain is not related to food or menstrual cycle  Only other associated symptom is nonbloody diarrhea  Denies history of abdominal surgeries, dysuria or frequency, fevers, vaginal bleeding or discharge, nausea, or vomiting  Denies history of STIs  Last menstrual period 1 month ago          None       History reviewed  No pertinent past medical history  History reviewed  No pertinent surgical history  History reviewed  No pertinent family history  I have reviewed and agree with the history as documented  E-Cigarette/Vaping    E-Cigarette Use Never User      E-Cigarette/Vaping Substances     Social History     Tobacco Use    Smoking status: Never Smoker    Smokeless tobacco: Never Used   Vaping Use    Vaping Use: Never used   Substance Use Topics    Alcohol use: Never    Drug use: Never        Review of Systems   All other systems reviewed and are negative        Physical Exam  ED Triage Vitals [21 0905]   Temperature Pulse Respirations Blood Pressure SpO2   98 6 °F (37 °C) 98 18 136/82 100 %      Temp Source Heart Rate Source Patient Position - Orthostatic VS BP Location FiO2 (%)   Oral Monitor Sitting Left arm --      Pain Score       5             Orthostatic Vital Signs  Vitals:    21 0905 21 1045 21 1252   BP: 136/82 132/81 130/80   Pulse: 98 89 85   Patient Position - Orthostatic VS: Sitting Sitting Lying       Physical Exam  Vitals and nursing note reviewed  Constitutional:       General: She is not in acute distress  Appearance: She is not ill-appearing, toxic-appearing or diaphoretic  HENT:      Head: Normocephalic and atraumatic  Cardiovascular:      Rate and Rhythm: Normal rate and regular rhythm  Heart sounds: Normal heart sounds  Pulmonary:      Effort: Pulmonary effort is normal  No respiratory distress  Breath sounds: Normal breath sounds  No wheezing  Abdominal:      General: Bowel sounds are normal  There is no distension or abdominal bruit  There are no signs of injury  Palpations: Abdomen is soft  There is no fluid wave, hepatomegaly, splenomegaly, mass or pulsatile mass  Tenderness: There is abdominal tenderness in the right upper quadrant  There is no right CVA tenderness, left CVA tenderness, guarding or rebound  Negative signs include Virk's sign, Rovsing's sign, McBurney's sign, psoas sign and obturator sign  Hernia: No hernia is present  Skin:     General: Skin is warm and dry  Neurological:      Mental Status: She is alert           ED Medications  Medications   sucralfate (CARAFATE) tablet 1 g (1 g Oral Given 7/19/21 1001)       Diagnostic Studies  Results Reviewed     Procedure Component Value Units Date/Time    UA w Reflex to Microscopic w Reflex to Culture [[id number]] Collected: 07/19/21 0957    Lab Status: Final result Specimen: Urine, Clean Catch Updated: 07/19/21 1030     Color, UA Yellow     Clarity, UA Clear     Specific Gravity, UA 1 023     pH, UA 6 0     Leukocytes, UA Negative     Nitrite, UA Negative     Protein, UA Negative mg/dl      Glucose, UA Negative mg/dl      Ketones, UA Negative mg/dl      Urobilinogen, UA 0 2 E U /dl      Bilirubin, UA Negative     Blood, UA Negative    Comprehensive metabolic panel [[id number]] Collected: 07/19/21 0957    Lab Status: Final result Specimen: Blood from Arm, Right Updated: 07/19/21 1028 Sodium 136 mmol/L      Potassium 4 0 mmol/L      Chloride 108 mmol/L      CO2 23 mmol/L      ANION GAP 5 mmol/L      BUN 11 mg/dL      Creatinine 0 65 mg/dL      Glucose 92 mg/dL      Calcium 8 8 mg/dL      AST 18 U/L      ALT 32 U/L      Alkaline Phosphatase 70 U/L      Total Protein 8 2 g/dL      Albumin 3 8 g/dL      Total Bilirubin 0 28 mg/dL      eGFR 127 ml/min/1 73sq m     Narrative:      National Kidney Disease Foundation guidelines for Chronic Kidney Disease (CKD):     Stage 1 with normal or high GFR (GFR > 90 mL/min/1 73 square meters)    Stage 2 Mild CKD (GFR = 60-89 mL/min/1 73 square meters)    Stage 3A Moderate CKD (GFR = 45-59 mL/min/1 73 square meters)    Stage 3B Moderate CKD (GFR = 30-44 mL/min/1 73 square meters)    Stage 4 Severe CKD (GFR = 15-29 mL/min/1 73 square meters)    Stage 5 End Stage CKD (GFR <15 mL/min/1 73 square meters)  Note: GFR calculation is accurate only with a steady state creatinine    CBC and differential [[id number]] Collected: 07/19/21 0957    Lab Status: Final result Specimen: Blood from Arm, Right Updated: 07/19/21 1005     WBC 6 66 Thousand/uL      RBC 4 86 Million/uL      Hemoglobin 13 4 g/dL      Hematocrit 41 6 %      MCV 86 fL      MCH 27 6 pg      MCHC 32 2 g/dL      RDW 13 6 %      MPV 11 3 fL      Platelets 867 Thousands/uL      nRBC 0 /100 WBCs      Neutrophils Relative 61 %      Immat GRANS % 0 %      Lymphocytes Relative 29 %      Monocytes Relative 8 %      Eosinophils Relative 1 %      Basophils Relative 1 %      Neutrophils Absolute 4 09 Thousands/µL      Immature Grans Absolute 0 02 Thousand/uL      Lymphocytes Absolute 1 95 Thousands/µL      Monocytes Absolute 0 52 Thousand/µL      Eosinophils Absolute 0 04 Thousand/µL      Basophils Absolute 0 04 Thousands/µL     POCT pregnancy, urine [[id number]]  (Normal) Resulted: 07/19/21 0951    Lab Status: Final result Specimen: Urine Updated: 07/19/21 0951     EXT PREG TEST UR (Ref: Negative) Negative Control Valid    Urine Macroscopic, POC [[id number]] Collected: 07/19/21 0923    Lab Status: Final result Specimen: Urine Updated: 07/19/21 0924     Color, UA Yellow     Clarity, UA Clear     pH, UA 6 0     Leukocytes, UA Negative     Nitrite, UA Negative     Protein, UA Negative mg/dl      Glucose, UA Negative mg/dl      Ketones, UA Negative mg/dl      Urobilinogen, UA 0 2 E U /dl      Bilirubin, UA Negative     Blood, UA Negative     Specific Gravity, UA >=1 030    Narrative:      CLIN[facility] RESULT                 US pelvis complete w transvaginal   Final Result by Thi Lacy MD (07/19 1214)       Unremarkable pelvic ultrasound  Workstation performed: [id number]               Procedures  Procedures      ED Course  ED Course as of Jul 19 1633   Mon Jul 19, 2021   [address] ddx includes ectopic pregnancy, ovarian torsion, pyelonephritis due to h/o UTIs, gallblader pathology, appendicitis  Workup includes CBC, CMP, POC biliary US, UA, Upreg, US pelvis  POC biliary US negative for gallstones or gallbladder wall thickening  SBIRT 20yo+      Most Recent Value   SBIRT (24 yo +)   In order to provide better care to our patients, we are screening all of our patients for alcohol and drug use  Would it be okay to ask you these screening questions? No Filed at: 07/19/2021 0909                MDM  Number of Diagnoses or Management Options  Right upper quadrant abdominal pain: new and requires workup     Amount and/or Complexity of Data Reviewed  Clinical lab tests: ordered and reviewed  Tests in the radiology section of CPT®: ordered and reviewed  Review and summarize past medical records: yes    Risk of Complications, Morbidity, and/or Mortality  Presenting problems: low  Diagnostic procedures: minimal  Management options: minimal  General comments: Workup negative for intra-abdominal pelvic pathology  Pain level improved in the ED    Patient provided information for establishing care with [facility]  Given strict return precautions  Patient Progress  Patient progress: improved      Disposition  Final diagnoses:   Right upper quadrant abdominal pain     Time reflects when diagnosis was documented in both MDM as applicable and the Disposition within this note     Time User Action Codes Description Comment    7/19/2021 12:29 PM Samantha Shen [R10 11] Right upper quadrant abdominal pain       ED Disposition     ED Disposition Condition Date/Time Comment    Discharge Stable Mon Jul 19, 2021 12:29 PM Vasile Ferguson discharge to home/self care  Follow-up Information    None         Discharge Medication List as of 7/19/2021 12:46 PM      START taking these medications    Details   dicyclomine (BENTYL) 20 mg tablet Take 1 tablet (20 mg total) by mouth 2 (two) times a day for 3 days, Starting Mon 7/19/2021, Until Thu 7/22/2021, Print           No discharge procedures on file  PDMP Review     None           ED Provider  Attending physically available and evaluated Vasile Ferguson I managed the patient along with the ED Attending      Electronically Signed by         Abhishek Kim MD  07/19/21 9400

## 2021-07-19 NOTE — DISCHARGE INSTRUCTIONS
Return to emergency department if you experience acute abdominal pain that does not improve, development of fever, new urinary symptoms, or nausea and vomiting      [address] [facility]  [address] [facility], [address] [facility],[address]  [address] [facility] [address]

## 2021-07-27 ENCOUNTER — RA CDI HCC (OUTPATIENT)
Dept: OTHER | Facility: HOSPITAL | Age: 21
End: 2021-07-27

## 2021-07-27 NOTE — PROGRESS NOTES
Maria A [facility] 75  coding opportunities          Chart reviewed, no opportunity found: CHART REVIEWED, NO OPPORTUNITY FOUND                     Patients insurance company: [employer] (Medicare Advantage and Commercial)

## 2021-08-02 ENCOUNTER — OFFICE VISIT (OUTPATIENT)
Dept: INTERNAL MEDICINE CLINIC | Facility: CLINIC | Age: 21
End: 2021-08-02

## 2021-08-02 VITALS
TEMPERATURE: 97.9 F | DIASTOLIC BLOOD PRESSURE: 73 MMHG | HEART RATE: 89 BPM | WEIGHT: 178 LBS | BODY MASS INDEX: 33.63 KG/M2 | SYSTOLIC BLOOD PRESSURE: 106 MMHG

## 2021-08-02 DIAGNOSIS — R39.9 URINARY SYMPTOM OR SIGN: ICD-10-CM

## 2021-08-02 DIAGNOSIS — Z01.419 ROUTINE GYNECOLOGICAL EXAMINATION: ICD-10-CM

## 2021-08-02 DIAGNOSIS — G43.709 CHRONIC MIGRAINE WITHOUT AURA WITHOUT STATUS MIGRAINOSUS, NOT INTRACTABLE: Primary | ICD-10-CM

## 2021-08-02 DIAGNOSIS — Z23 NEED FOR TDAP VACCINATION: ICD-10-CM

## 2021-08-02 DIAGNOSIS — Z13.31 POSITIVE DEPRESSION SCREENING: ICD-10-CM

## 2021-08-02 PROCEDURE — 1036F TOBACCO NON-USER: CPT | Performed by: PHYSICIAN ASSISTANT

## 2021-08-02 PROCEDURE — 3725F SCREEN DEPRESSION PERFORMED: CPT | Performed by: PHYSICIAN ASSISTANT

## 2021-08-02 PROCEDURE — 99203 OFFICE O/P NEW LOW 30 MIN: CPT | Performed by: PHYSICIAN ASSISTANT

## 2021-08-02 PROCEDURE — 90471 IMMUNIZATION ADMIN: CPT

## 2021-08-02 PROCEDURE — 90715 TDAP VACCINE 7 YRS/> IM: CPT

## 2021-08-02 RX ORDER — SUMATRIPTAN 50 MG/1
50 TABLET, FILM COATED ORAL ONCE AS NEEDED
Qty: 9 TABLET | Refills: 2 | Status: SHIPPED | OUTPATIENT
Start: 2021-08-02 | End: 2022-05-04

## 2021-08-02 RX ORDER — TOPIRAMATE 25 MG/1
25 TABLET ORAL 2 TIMES DAILY
Qty: 60 TABLET | Refills: 2 | Status: SHIPPED | OUTPATIENT
Start: 2021-08-02 | End: 2022-05-04

## 2021-08-02 RX ORDER — MINOCYCLINE HYDROCHLORIDE 90 MG/1
1 CAPSULE, EXTENDED RELEASE ORAL DAILY
COMMUNITY
Start: 2021-05-04
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
## 2021-08-02 NOTE — PATIENT INSTRUCTIONS
Today we talked about your migraines and the frequency and nature of your migraines  We will start you on a once a day magnesium supplement to prevent your migraines  Since her migraines are happening so frequently we will start you on a maintenance medication call Topamax 25 mg that you will take 1 pill twice a day  In addition I also prescribed sumatriptan which is an abortive medication that you can use at onset of the migraine and you can certainly use additional Tylenol or Motrin products if needed if the headache continues  Please remember to take care of yourself making sure that you get enough sleep, drinking plenty of water, at least 80 oz of water a day as well as eating healthy meals, small portions frequently throughout the day  Please make sure you schedule an appointment with gynecology   also look into scheduling an appointment with an ophthalmologist for an eye exam     please consider at least scheduling an appointment with a therapist to talk about your mental health

## 2021-08-17 ENCOUNTER — RA CDI HCC (OUTPATIENT)
Dept: OTHER | Facility: HOSPITAL | Age: 21
End: 2021-08-17

## 2021-08-17 NOTE — PROGRESS NOTES
Maria A [facility]  coding opportunities       Chart reviewed, no opportunity found: CHART REVIEWED, NO OPPORTUNITY FOUND                        Patients insurance company: [employer] (Medicare Advantage and Commercial)           8/2/21 NOTES STATES DEPRESSION WILL BE ADDRESS AT THE NEXT OV

## 2021-09-15 ENCOUNTER — NURSE TRIAGE (OUTPATIENT)
Dept: OTHER | Facility: OTHER | Age: 21
End: 2021-09-15

## 2021-09-15 DIAGNOSIS — Z20.828 SARS-ASSOCIATED CORONAVIRUS EXPOSURE: Primary | ICD-10-CM

## 2021-09-16 ENCOUNTER — OFFICE VISIT (OUTPATIENT)
Dept: URGENT CARE | Age: 21
End: 2021-09-16
Payer: COMMERCIAL

## 2021-09-16 PROCEDURE — U0003 INFECTIOUS AGENT DETECTION BY NUCLEIC ACID (DNA OR RNA); SEVERE ACUTE RESPIRATORY SYNDROME CORONAVIRUS 2 (SARS-COV-2) (CORONAVIRUS DISEASE [COVID-19]), AMPLIFIED PROBE TECHNIQUE, MAKING USE OF HIGH THROUGHPUT TECHNOLOGIES AS DESCRIBED BY CMS-2020-01-R: HCPCS | Performed by: PHYSICIAN ASSISTANT

## 2021-09-16 PROCEDURE — U0005 INFEC AGEN DETEC AMPLI PROBE: HCPCS | Performed by: PHYSICIAN ASSISTANT

## 2021-09-16 NOTE — TELEPHONE ENCOUNTER
Reason for Disposition   [1] COVID-19 infection suspected by caller or triager AND [2] mild symptoms (cough, fever, or others) AND [0] no complications or SOB    Answer Assessment - Initial Assessment Questions  Were you within 6 feet or less, for up to 15 minutes or more with a person that has a confirmed COVID-19 test?        yes     What was the date of your exposure? Couple days ago     Are you experiencing any symptoms attributed to the virus?  (Assess for SOB, cough, fever, difficulty breathing)        Yes, sore throat, congestion     HIGH RISK: Do you have any history heart or lung conditions, weakened immune system, diabetes, Asthma, CHF, HIV, COPD, Chemo, renal failure, sickle cell, etc?        Denies     PREGNANCY: Are you pregnant or did you recently give birth?         Denies    Protocols used: CORONAVIRUS (COVID-19) DIAGNOSED OR SUSPECTED-ADULT-

## 2021-09-16 NOTE — TELEPHONE ENCOUNTER
Regarding: Covid Exposure Symptomatic   ----- Message from [facility] sent at 9/15/2021  6:21 PM EDT -----  "Had exposure to someone who tested positive, my symptoms are sore throat "

## 2021-09-16 NOTE — TELEPHONE ENCOUNTER
Robert, called patient  Patient states she feels OK aside from her scratchy sore throat  I advised patient I see on call nurse placed an COVID order and asked if she planned on going to obtain that  Per patient she will be going today  Advised patient we will call her with results once they are available

## 2021-10-13 ENCOUNTER — OFFICE VISIT (OUTPATIENT)
Dept: INTERNAL MEDICINE CLINIC | Facility: CLINIC | Age: 21
End: 2021-10-13

## 2021-10-13 VITALS
DIASTOLIC BLOOD PRESSURE: 76 MMHG | OXYGEN SATURATION: 100 % | TEMPERATURE: 98 F | HEART RATE: 93 BPM | HEIGHT: 61 IN | BODY MASS INDEX: 34.93 KG/M2 | WEIGHT: 185 LBS | SYSTOLIC BLOOD PRESSURE: 108 MMHG

## 2021-10-13 DIAGNOSIS — Z23 NEED FOR INFLUENZA VACCINATION: ICD-10-CM

## 2021-10-13 DIAGNOSIS — Z09 RESOLVED CONDITION, FOLLOW-UP: ICD-10-CM

## 2021-10-13 DIAGNOSIS — M54.50 RECURRENT LOW BACK PAIN: Primary | ICD-10-CM

## 2021-10-13 PROCEDURE — 1036F TOBACCO NON-USER: CPT | Performed by: PHYSICIAN ASSISTANT

## 2021-10-13 PROCEDURE — 90686 IIV4 VACC NO PRSV 0.5 ML IM: CPT | Performed by: PHYSICIAN ASSISTANT

## 2021-10-13 PROCEDURE — 99213 OFFICE O/P EST LOW 20 MIN: CPT | Performed by: PHYSICIAN ASSISTANT

## 2021-10-13 PROCEDURE — 90471 IMMUNIZATION ADMIN: CPT | Performed by: PHYSICIAN ASSISTANT

## 2021-10-13 PROCEDURE — 3008F BODY MASS INDEX DOCD: CPT | Performed by: PHYSICIAN ASSISTANT

## 2021-10-13 RX ORDER — TIZANIDINE HYDROCHLORIDE 2 MG/1
2 CAPSULE, GELATIN COATED ORAL
Qty: 10 CAPSULE | Refills: 0 | Status: SHIPPED | OUTPATIENT
Start: 2021-10-13 | End: 2022-05-04

## 2021-10-21 ENCOUNTER — EVALUATION (OUTPATIENT)
Dept: PHYSICAL THERAPY | Facility: REHABILITATION | Age: 21
End: 2021-10-21
Payer: COMMERCIAL

## 2021-10-21 DIAGNOSIS — M54.50 RECURRENT LOW BACK PAIN: ICD-10-CM

## 2021-10-21 PROCEDURE — 97161 PT EVAL LOW COMPLEX 20 MIN: CPT

## 2021-10-26 ENCOUNTER — OFFICE VISIT (OUTPATIENT)
Dept: PHYSICAL THERAPY | Facility: REHABILITATION | Age: 21
End: 2021-10-26
Payer: COMMERCIAL

## 2021-10-26 DIAGNOSIS — M54.50 RECURRENT LOW BACK PAIN: Primary | ICD-10-CM

## 2021-10-26 PROCEDURE — 97530 THERAPEUTIC ACTIVITIES: CPT

## 2021-10-26 PROCEDURE — 97110 THERAPEUTIC EXERCISES: CPT

## 2021-10-26 PROCEDURE — 97112 NEUROMUSCULAR REEDUCATION: CPT

## 2021-10-27 ENCOUNTER — TELEPHONE (OUTPATIENT)
Dept: OBGYN CLINIC | Facility: CLINIC | Age: 21
End: 2021-10-27

## 2022-03-29 NOTE — PATIENT INSTRUCTIONS
Try to get 150 minutes of exercise per week  Eat a balanced diet avoiding sugary beverages, refined sugars, and processed foods  Try to eat a combined 5-6 servings of fruits and vegetables per day  Try to do monthly breast exams, or frequently enough that you are aware if there are any changes in the appearance or texture  Call the office with any change  Take a prenatal vitamin  Condoms to prevent STDs  Return in 1 year for your yearly exam or sooner as needed  Bladder trainin  Start by going to the toilet and trying to urinate as often as your shortest voiding interval (length between trips to bathroom) -- I e  if urgency typically arises every 1 -2 hours, begin by urinating hourly  You will then attempt to void every hour, regardless of if you feel urge or not  (Do this during the day -- you do not need to get up at nighttime)   2  If you get urge to void prior to designated time/schedule do not rush to bathroom  Try distraction and/or relaxation techniques  (Don't run to the bathroom  Stand still or sit down  Deep breathing  Concentrate on/distract yourself with other tasks  Imagine the pressure becoming less and less)  When you feel in control of your symptoms, walk slowly to the bathroom  3  Continue on this schedule until you can go 1 day without leakage  Then you will increase scheduled trips to the bathroom by 15 minutes   Continue to extend time by 15 minutes until you can go 4 hours between trips to bathroom (which is considered a normal interval) or until you are confortable with interval

## 2022-03-30 ENCOUNTER — OFFICE VISIT (OUTPATIENT)
Dept: OBGYN CLINIC | Facility: CLINIC | Age: 22
End: 2022-03-30
Payer: COMMERCIAL

## 2022-03-30 VITALS
BODY MASS INDEX: 35.61 KG/M2 | WEIGHT: 188.6 LBS | SYSTOLIC BLOOD PRESSURE: 116 MMHG | DIASTOLIC BLOOD PRESSURE: 60 MMHG | HEIGHT: 61 IN

## 2022-03-30 DIAGNOSIS — N39.41 URGE INCONTINENCE OF URINE: ICD-10-CM

## 2022-03-30 DIAGNOSIS — R10.2 PELVIC PAIN: ICD-10-CM

## 2022-03-30 DIAGNOSIS — Z01.419 ENCOUNTER FOR GYNECOLOGICAL EXAMINATION WITHOUT ABNORMAL FINDING: Primary | ICD-10-CM

## 2022-03-30 DIAGNOSIS — Z71.85 HPV VACCINE COUNSELING: ICD-10-CM

## 2022-03-30 DIAGNOSIS — N64.4 BREAST PAIN, LEFT: ICD-10-CM

## 2022-03-30 DIAGNOSIS — Z30.09 GENERAL COUNSELING FOR INITIATION OF OTHER CONTRACEPTIVE MEASURES: ICD-10-CM

## 2022-03-30 PROCEDURE — G0145 SCR C/V CYTO,THINLAYER,RESCR: HCPCS | Performed by: PHYSICIAN ASSISTANT

## 2022-03-30 PROCEDURE — 99213 OFFICE O/P EST LOW 20 MIN: CPT | Performed by: PHYSICIAN ASSISTANT

## 2022-03-30 PROCEDURE — S0610 ANNUAL GYNECOLOGICAL EXAMINA: HCPCS | Performed by: PHYSICIAN ASSISTANT

## 2022-03-30 NOTE — PROGRESS NOTES
Patient is here for annual exam   She states that when she has the feeling to void, she will have to go right away; cannot hold her urine  LMP:3/16/22  Periods are regular  Periods last 5-6 days  Patient is sexually active  Patient does not desire STD testing  Birth control method: none  Patient hasn't completed Gardasil series  Last Pap: N/A  Pap: N/A/HPV: N/A    Family history of breast, uterine or colon cancer: not that she is aware of

## 2022-03-30 NOTE — PROGRESS NOTES
Assessment   25 y o  Marlo Westfield presenting for annual exam      Plan   Diagnoses and all orders for this visit:    Encounter for gynecological examination without abnormal finding  -     Liquid-based pap, screening    Normal findings on routine exam   Encouraged 150 min of exercise per week  Reviewed balanced diet  Multivitamin encouraged   Breast awareness/SBE encouraged     Breast pain, left  -     US breast left limited (diagnostic); Future    Reviewed common causes of breast pain, possibly stemming from pendulous breasts w/o adequate support  Will await imaging and tx accordingly  Urge incontinence of urine  -     Ambulatory Referral to Physical Therapy; Future    Urgency may be stemming from hydration  Discussed pelvic floor therapy to reduce leakage  Timed voiding to help dec urgency  If no relief with these can refer to urology  General counseling for initiation of other contraceptive measures  Griselda is currently being treated with Imitrex, Topamax, and Mg for migraines  Dx on file shows migraine without aura, however, Griselda reports a visual aura prior to migraine onset  Reviewed this is a contraindication to estrogen containing therapies, and she is planning to f/u with PCP regarding migraine sx  We reviewed available progesterone only contraception options, along with the risks, benefits, and efficacy profile of each method in detail  This review included discussion of the IUD (Mirena, Paragard, Superior, [place]), injectable (Depo Provera), implant (Nexplanon), POP, barrier methods (condoms)  All questions were answered to her satisfaction  At this time she is not TTC and not actively trying to prevent pregnancy  She was recommended to begin prenatal vitamin to reduce birth defects should pregnancy occur  She is aware to call office if would like contraception in future  Aware condoms are recommended to prevent STDs       Pelvic pain  Discussed tracking pain to determine if there is a GYN, , or GI origin for pain  Advised logging character, duration, frequency, relation to BM, urinary sx, and cycle  Will report back if persistent  HPV vaccine counseling  Gardasil 9 was advised for prevention of HPV-related disease  We discussed risks/benefits, SE's/AE's at length and all questions were answered  Written info was provided for review  The patient agrees to consider and is aware she may call to schedule injection #1 at any time  Pap done today      RTO one year for yearly exam or sooner as needed  __________________________________________________________________    Subjective     Annmarie Neil is a 25 y o  Jerrald Look with regular menses who is sexually active and not currently using contraception presenting for annual exam  She does not want STD testing today  LMP approximately 2 weeks ago  SCREENING  Last Pap: N/A    GYN  Complaints: c/o infrequent pelvic pain  Has had fleeting sharp pain, lasting seconds, over the suprapubic region  Occurs every couple of months  Unsure of relation to menses, bowel habits, or urination  Denies dyspareunia, genital discharge, genital ulcers, and vulvar/vaginal symptoms  Periods are regular every 28-30 days, lasting 5 days  Changes regular sized tampon q 2-3 hours  Dysmenorrhea: mild, occurring first 1-2 days of flow  Cyclic symptoms include none  Sexually active: Yes - single partner - male   Hx Abnormal pap: N/A-- first pap collected today  We reviewed AS[facility]P guidelines for Pap testing today  Gardasil: She has not completed the Gardasil series  OB     Not actively TTC, not actively trying to prevent    Complaints: C/o urinary urgency and urge incontinence  Drinks close to 100 oz of water daily  Reports urge related leakage  Denies frequency, hematuria, leakage / change in stream, difficulty urinating  BREAST  Complaints: C/o left breast pain x 3 days  Constant, sharp  No prior occurrence   Requesting imaging for evaluation  Denies recent injury  No neck/back disorder  1-2 servings of caffeine per week  No tx tried  Denies: breast lump, breast tenderness, dryness, nipple discharge, pruritus, skin color change, and skin lesion(s)   Personal hx: none    Pertinent Family Hx:   Family hx of breast cancer: no  Family hx of ovarian cancer: no  Family hx of colon cancer: no    GENERAL  PMH reviewed/updated and is as below  Patient does follow with a PCP  History reviewed  No pertinent past medical history  History reviewed  No pertinent surgical history        Current Outpatient Medications:     Magnesium 400 MG CAPS, Take 1 caps PO daily (Patient not taking: Reported on 3/30/2022 ), Disp: 30 capsule, Rfl: 2    Minocycline HCl ER 90 MG CP24, Take 1 capsule by mouth daily (Patient not taking: Reported on 3/30/2022 ), Disp: , Rfl:     SUMAtriptan (IMITREX) 50 mg tablet, Take 1 tablet (50 mg total) by mouth once as needed for migraine for up to 1 dose (Patient not taking: Reported on 3/30/2022 ), Disp: 9 tablet, Rfl: 2    TiZANidine (ZANAFLEX) 2 MG capsule, Take 1 capsule (2 mg total) by mouth daily at bedtime as needed for muscle spasms (Patient not taking: Reported on 3/30/2022 ), Disp: 10 capsule, Rfl: 0    topiramate (TOPAMAX) 25 mg tablet, Take 1 tablet (25 mg total) by mouth 2 (two) times a day (Patient not taking: Reported on 3/30/2022 ), Disp: 60 tablet, Rfl: 2    No Known Allergies    Social History     Socioeconomic History    Marital status: Single     Spouse name: Not on file    Number of children: 0    Years of education: Not on file    Highest education level: Not on file   Occupational History    Not on file   Tobacco Use    Smoking status: Never Smoker    Smokeless tobacco: Never Used   Vaping Use    Vaping Use: Never used   Substance and Sexual Activity    Alcohol use: Never    Drug use: Never    Sexual activity: Yes     Partners: Male   Other Topics Concern    Not on file   Social History Narrative    Not on file     Social Determinants of Health     Financial Resource Strain: High Risk    Difficulty of Paying Living Expenses: Hard   Food Insecurity: Food Insecurity Present    Worried About Running Out of Food in the Last Year: Often true    [place] of Food in the Last Year: Often true   Transportation Needs: No Transportation Needs    Lack of Transportation (Medical): No    Lack of Transportation (Non-Medical): No   Physical Activity: Sufficiently Active    Days of Exercise per Week: 7 days    Minutes of Exercise per Session: 30 min   Stress: Stress Concern Present    Feeling of Stress : To some extent   Social Connections: Socially Isolated    Frequency of Communication with Friends and Family: More than three times a week    Frequency of Social Gatherings with Friends and Family: Never    Attends Confucianism Services: Never    Active Member of Clubs or Organizations: No    Attends Club or Organization Meetings: Never    Marital Status: Never    Intimate Partner Violence: Not At Risk    Fear of Current or Ex-Partner: No    Emotionally Abused: No    Physically Abused: No    Sexually Abused: No   Housing Stability: High Risk    Unable to Pay for Housing in the Last Year: Yes    Number of [place] in the Last Year: 1    Unstable Housing in the Last Year: Yes       Review of Systems     ROS:  Constitutional: Negative for appetite change, fatigue and unexpected weight change  Respiratory: Negative for cough, wheezing, shortness of breath  Cardiovascular: Negative for chest pain, palpitations, and leg swelling  Gastrointestinal: Negative for abdominal pain and change in bowel habits/constipation/diarrhea  Breasts:  Negative for tenderness, pain or masses  Genitourinary: Negative for difficulty urinating, pelvic pain, vaginal bleeding, vaginal discharge, itching or odor  Psychiatric: Negative for mood difficulties        Objective      /60 (BP Location: Left arm, Patient Position: Sitting, Cuff Size: Standard)   Ht 5' 0 75" (1 543 m)   Wt 85 5 kg (188 lb 9 6 oz)   LMP 03/16/2022   BMI 35 93 kg/m²     Physical Examination:    Patient appears well and is not in distress  Neck is supple without masses  Breasts are symmetrical without mass, tenderness, nipple discharge, skin changes or adenopathy  Abdomen is soft and nontender without masses  External genitals are normal without lesions or rashes  Urethral meatus and urethra are normal  Bladder is normal to palpation  Vagina is without bleeding  Leukorrhea present  Cervix is normal without discharge or lesion  Uterus is normal, mobile, nontender without palpable mass  Adnexa are normal, nontender, without palpable mass

## 2022-04-07 LAB
LAB AP GYN PRIMARY INTERPRETATION: NORMAL
Lab: NORMAL

## 2022-04-27 ENCOUNTER — RA CDI HCC (OUTPATIENT)
Dept: OTHER | Facility: HOSPITAL | Age: 22
End: 2022-04-27

## 2022-04-27 NOTE — PROGRESS NOTES
[facility]  coding opportunities       Chart reviewed, no opportunity found: CHART REVIEWED, NO OPPORTUNITY FOUND        Patients Insurance        Commercial Insurance: [employer]

## 2022-05-04 ENCOUNTER — OFFICE VISIT (OUTPATIENT)
Dept: INTERNAL MEDICINE CLINIC | Facility: CLINIC | Age: 22
End: 2022-05-04

## 2022-05-04 VITALS
HEART RATE: 92 BPM | WEIGHT: 185 LBS | HEIGHT: 61 IN | TEMPERATURE: 97.8 F | DIASTOLIC BLOOD PRESSURE: 80 MMHG | BODY MASS INDEX: 34.93 KG/M2 | SYSTOLIC BLOOD PRESSURE: 121 MMHG | OXYGEN SATURATION: 99 %

## 2022-05-04 DIAGNOSIS — E66.09 CLASS 1 OBESITY DUE TO EXCESS CALORIES WITHOUT SERIOUS COMORBIDITY WITH BODY MASS INDEX (BMI) OF 30.0 TO 30.9 IN ADULT: ICD-10-CM

## 2022-05-04 DIAGNOSIS — R29.818 SUSPECTED SLEEP APNEA: Primary | ICD-10-CM

## 2022-05-04 PROBLEM — E66.811 CLASS 1 OBESITY DUE TO EXCESS CALORIES WITHOUT SERIOUS COMORBIDITY WITH BODY MASS INDEX (BMI) OF 30.0 TO 30.9 IN ADULT: Status: ACTIVE | Noted: 2022-05-04

## 2022-05-04 PROCEDURE — 99213 OFFICE O/P EST LOW 20 MIN: CPT | Performed by: HOSPITALIST

## 2022-05-04 PROCEDURE — 3008F BODY MASS INDEX DOCD: CPT | Performed by: HOSPITALIST

## 2022-05-04 PROCEDURE — 1036F TOBACCO NON-USER: CPT | Performed by: HOSPITALIST

## 2022-05-04 RX ORDER — MINOCYCLINE HYDROCHLORIDE 100 MG/1
100 CAPSULE ORAL 2 TIMES DAILY
COMMUNITY
Start: 2022-04-21 | End: 2022-05-04

## 2022-05-04 NOTE — PATIENT INSTRUCTIONS
Sleep Apnea   AMBULATORY CARE:   Sleep apnea  is a condition that causes you to stop breathing often during sleep  Types of sleep apnea:   · Obstructive sleep apnea (HUGO)  is the most common kind  The muscles and tissues around your throat relax and block air from passing through  Obesity, use of alcohol or cigarettes, or a family history are common causes  HUGO may increase your risk for complications after surgery  · Central sleep apnea (CSA)  means your brain does not send signals to the muscles that control breathing  You do not take a breath even though your airway is open  Common causes include medical conditions such as heart failure, being older than 40, or use of opioids  · Complex (or mixed) sleep apnea  means you have both obstructive and central sleep apnea  Common signs and symptoms:   · Loud snoring or long pauses in breathing    · Feeling sleepy, slow, and tired during the day    · Snorting, gasping, or choking while you sleep, and waking up suddenly because of these    · Feeling irritable during the day    · Dry mouth or a headache in the mornings    · Heavy night sweating    · A hard time thinking, remembering things, or focusing on your tasks the following day    Call your local emergency number (911 in the [address]) if:   · You have chest pain or trouble breathing  Call your doctor if:   · You have new or worsening signs or symptoms  · You have questions or concerns about your condition or care  Treatment  depends on the kind of apnea you have  · A mouth device  may be needed if you have mild sleep apnea  These are designed to keep your throat open  Ask your dentist or healthcare provider about the best mouth device for you  · A machine  may be used to help you get more air during sleep  A mask may be placed over your nose and mouth, or just your nose  The mask is hooked to the machine  You will get air through the mask      ? A continuous positive airway pressure (CPAP) machine  is used to keep your airway open during sleep  The machine blows a gentle stream of air into the mask when you breathe  This helps keep your airway open so you can breathe more regularly  Extra oxygen may be given through the machine  ? A bilevel positive airway pressure (BiPAP) machine  gives air but lowers the pressure when you breathe out  ? An adaptive servo-ventilator (ASV)  is a machine that learns your usual breathing pattern  Then, it uses pressure to give you air and prevent stops in your breathing  · Surgery  to expand your airway or remove extra tissues may be needed  Surgery is usually only considered if other treatments do not work  Manage or prevent sleep apnea:   · Reach and maintain a healthy weight  Ask your healthcare provider what a healthy weight is for you  Ask him or her to help you create a safe weight loss plan if you are overweight  Even a small goal of a 10% weight loss can improve your symptoms  · Do not smoke  Nicotine and other chemicals in cigarettes and cigars can cause lung damage  Ask your healthcare provider for information if you currently smoke and need help to quit  E-cigarettes or smokeless tobacco still contain nicotine  Talk to your healthcare provider before you use these products  · Do not drink alcohol or take sedative medicine before you go to sleep  Alcohol and sedatives can relax the muscles and tissues around your throat  This can block the airflow to your lungs  · Sleep on your side or use pillows designed to prevent sleep apnea  This prevents your tongue or other tissues from blocking your throat  You can also raise the head of your bed  Follow up with your doctor or specialist as directed: You may need to have blood tests during your follow-up visits  Work with your provider to find the right breathing support equipment and settings for you  Write down your questions so you remember to ask them during your visits    © Copyright [employer] 2022 Information is for End User's use only and may not be sold, redistributed or otherwise used for commercial purposes  All illustrations and images included in CareNotes® are the copyrighted property of [employer] M [employer]  or Nita Gilliland  The above information is an  only  It is not intended as medical advice for individual conditions or treatments  Talk to your doctor, nurse or pharmacist before following any medical regimen to see if it is safe and effective for you

## 2022-05-04 NOTE — PROGRESS NOTES
INTERNAL MEDICINE FOLLOW-UP OFFICE VISIT  [facility]  [address], [address]    NAME: Janetet Morton  AGE: 25 y o  SEX: female    DATE OF ENCOUNTER: 5/4/2022    Assessment and Plan     1  Suspected sleep apnea  Patient reports gasping for air at night, nighttime awakenings, daytime fatigue  Stop Bang score of 3 putting her at high risk for sleep apnea  · Referral to sleep medicine with sleep study provided   · Follow-up in 3 months for annual physical  - Ambulatory Referral to Sleep Medicine; Future    2  Class 1 obesity   BMI of 34 6   · Discussed diet lifestyle modification  · Patient would like to pursue conservative management at this time  If she does not have success she would like to be referred to Bariatric Medicine  Orders Placed This Encounter   Procedures    Ambulatory Referral to Sleep Medicine       BMI Counseling: Body mass index is 34 96 kg/m²  The BMI is above normal  Nutrition recommendations include decreasing portion sizes, encouraging healthy choices of fruits and vegetables and decreasing fast food intake  Exercise recommendations include moderate physical activity 150 minutes/week  No pharmacotherapy was ordered  Rationale for BMI follow-up plan is due to patient being overweight or obese  Healthcare Maintenance  Address at annual physical exam in 3 months  Chief Complaint     Chief Complaint   Patient presents with    Shortness of Breath     timesd while sleep not breathing       History of Present Illness     77-year-old female with past medical history of obesity who was attending an office visit for shortness of breath at night  She states that she awakens many times at night gasping for air  She is not aware that she snores a lot  Does have persistent daytime fatigue due to lack of sleep  Denies any significant shortness of breath in the day, chest pain, palpitations, nausea, vomiting        The following portions of the patient's history were reviewed and updated as appropriate: allergies, current medications, past family history, past medical history, past social history, past surgical history and problem list     Review of Systems       Review of Systems   Constitutional: Negative for chills and fever  HENT: Negative for congestion and sinus pressure  Respiratory: Positive for shortness of breath  Negative for cough  Cardiovascular: Negative for chest pain, palpitations and leg swelling  Gastrointestinal: Negative for abdominal pain, diarrhea, nausea and vomiting  Genitourinary: Negative for dysuria and hematuria  Musculoskeletal: Negative for arthralgias and back pain  Skin: Negative for color change and rash  Neurological: Negative for dizziness and headaches  Psychiatric/Behavioral: Negative for agitation and confusion  All other systems reviewed and are negative  All other systems reviewed and were negative  Medical History     History reviewed  No pertinent past medical history  History reviewed  No pertinent surgical history      Current Outpatient Medications:     Minocycline HCl ER 90 MG CP24, Take 1 capsule by mouth daily  , Disp: , Rfl:    Family History   Problem Relation Age of Onset    No Known Problems Mother     No Known Problems Father       Social History     Socioeconomic History    Marital status: Single     Spouse name: None    Number of children: 0    Years of education: None    Highest education level: None   Occupational History    None   Tobacco Use    Smoking status: Never Smoker    Smokeless tobacco: Never Used   Vaping Use    Vaping Use: Never used   Substance and Sexual Activity    Alcohol use: Never    Drug use: Never    Sexual activity: Yes     Partners: Male   Other Topics Concern    None   Social History Narrative    None     Social Determinants of Health     Financial Resource Strain: High Risk    Difficulty of Paying Living Expenses: Hard   Food Insecurity: Food Insecurity Present    Worried About Running Out of Food in the Last Year: Often true    [place] of Food in the Last Year: Often true   Transportation Needs: No Transportation Needs    Lack of Transportation (Medical): No    Lack of Transportation (Non-Medical): No   Physical Activity: Sufficiently Active    Days of Exercise per Week: 7 days    Minutes of Exercise per Session: 30 min   Stress: Stress Concern Present    Feeling of Stress : To some extent   Social Connections: Socially Isolated    Frequency of Communication with Friends and Family: More than three times a week    Frequency of Social Gatherings with Friends and Family: Never    Attends Zoroastrianism Services: Never    Active Member of Clubs or Organizations: No    Attends Club or Organization Meetings: Never    Marital Status: Never    Intimate Partner Violence: Not At Risk    Fear of Current or Ex-Partner: No    Emotionally Abused: No    Physically Abused: No    Sexually Abused: No   Housing Stability: High Risk    Unable to Pay for Housing in the Last Year: Yes    Number of [place] in the Last Year: 1    Unstable Housing in the Last Year: Yes      No Known Allergies     Active Problem List     Patient Active Problem List   Diagnosis    Recurrent low back pain       Objective     /80 (BP Location: Left arm, Patient Position: Sitting, Cuff Size: Large)   Pulse 92   Temp 97 8 °F (36 6 °C) (Temporal)   Ht 5' 1" (1 549 m)   Wt 83 9 kg (185 lb)   SpO2 99%   BMI 34 96 kg/m²     Physical Exam  Vitals and nursing note reviewed  Constitutional:       General: She is not in acute distress  Appearance: Normal appearance  She is well-developed  She is obese  She is not ill-appearing  HENT:      Head: Normocephalic and atraumatic  Mouth/Throat:      Mouth: Mucous membranes are moist    Eyes:      Extraocular Movements: Extraocular movements intact        Conjunctiva/sclera: Conjunctivae normal       Pupils: Pupils are equal, round, and reactive to light  Cardiovascular:      Rate and Rhythm: Normal rate and regular rhythm  Pulses: Normal pulses  Heart sounds: Normal heart sounds  No murmur heard  No friction rub  No gallop  Pulmonary:      Effort: Pulmonary effort is normal  No respiratory distress  Breath sounds: Normal breath sounds  No wheezing or rales  Abdominal:      General: Abdomen is flat  Bowel sounds are normal  There is no distension  Palpations: Abdomen is soft  Tenderness: There is no abdominal tenderness  There is no guarding  Musculoskeletal:      Cervical back: Neck supple  Right lower leg: No edema  Left lower leg: No edema  Skin:     General: Skin is warm and dry  Neurological:      General: No focal deficit present  Mental Status: She is alert and oriented to person, place, and time     Psychiatric:         Mood and Affect: Mood normal          Behavior: Behavior normal           Pertinent Laboratory/Diagnostic Studies:  CBC:   Lab Results   Component Value Date/Time    WBC 6 66 07/19/2021 09:57 AM    RBC 4 86 07/19/2021 09:57 AM    HGB 13 4 07/19/2021 09:57 AM    HCT 41 6 07/19/2021 09:57 AM    MCV 86 07/19/2021 09:57 AM    MCH 27 6 07/19/2021 09:57 AM    MCHC 32 2 07/19/2021 09:57 AM    RDW 13 6 07/19/2021 09:57 AM    MPV 11 3 07/19/2021 09:57 AM     07/19/2021 09:57 AM    NRBC 0 07/19/2021 09:57 AM    NEUTOPHILPCT 61 07/19/2021 09:57 AM    LYMPHOPCT 29 07/19/2021 09:57 AM    MONOPCT 8 07/19/2021 09:57 AM    EOSPCT 1 07/19/2021 09:57 AM    BASOPCT 1 07/19/2021 09:57 AM    NEUTROABS 4 09 07/19/2021 09:57 AM    LYMPHSABS 1 95 07/19/2021 09:57 AM    MONOSABS 0 52 07/19/2021 09:57 AM    EOSABS 0 04 07/19/2021 09:57 AM     Chemistry Profile:   Lab Results   Component Value Date/Time    K 4 0 07/19/2021 09:57 AM     07/19/2021 09:57 AM    CO2 23 07/19/2021 09:57 AM    BUN 11 07/19/2021 09:57 AM CREATININE 0 65 07/19/2021 09:57 AM    GLUC 92 07/19/2021 09:57 AM    CALCIUM 8 8 07/19/2021 09:57 AM    AST 18 07/19/2021 09:57 AM    ALT 32 07/19/2021 09:57 AM    ALKPHOS 70 07/19/2021 09:57 AM    EGFR 127 07/19/2021 09:57 AM     Endocrine Studies: No results found for: HGBA1C, XIO6YXDDYLRT, T3FREE, O3GLXDY, FREET4, THYMICROANTI, THGAB, TRIG, CHOL, CHOLESTEROL, HDL, LDLC, LDLCALC, LDL, LDLDIRECT, NONHDLC, [id number], PTH, [id number]    Current Medications     Current Outpatient Medications:     Minocycline HCl ER 90 MG CP24, Take 1 capsule by mouth daily  , Disp: , Rfl:     Health Maintenance     Health Maintenance   Topic Date Due    Hepatitis C Screening  Never done    COVID-19 Vaccine (1) Never done    HPV Vaccine (1 - 2-dose series) Never done    HIV Screening  Never done    BMI: Followup Plan  Never done    Annual Physical  Never done    Chlamydia Screening  11/18/2020    PT PLAN OF CARE  11/20/2021    Depression Screening  08/02/2022    Depression Follow-up Plan  08/02/2022    BMI: Adult  03/30/2023    Cervical Cancer Screening  03/30/2025    DTaP,Tdap,and Td Vaccines (2 - Td or Tdap) 08/02/2031    Influenza Vaccine  Completed    Pneumococcal Vaccine: Pediatrics (0 to 5 Years) and At-Risk Patients (6 to 59 Years)  Aged Out    HIB Vaccine  Aged Out    Hepatitis B Vaccine  Aged Out    IPV Vaccine  Aged Out    Hepatitis A Vaccine  Aged Out    Meningococcal ACWY Vaccine  Aged [employer] History   Administered Date(s) Administered    Influenza, injectable, quadrivalent, preservative free 0 5 mL 10/13/2021    Tdap 08/02/2021       Tato Aleax D O    Internal Medicine PGY-3  5/4/2022 3:55 PM

## 2022-08-22 ENCOUNTER — OFFICE VISIT (OUTPATIENT)
Dept: OBGYN CLINIC | Facility: CLINIC | Age: 22
End: 2022-08-22
Payer: COMMERCIAL

## 2022-08-22 VITALS — WEIGHT: 192 LBS | DIASTOLIC BLOOD PRESSURE: 76 MMHG | BODY MASS INDEX: 36.28 KG/M2 | SYSTOLIC BLOOD PRESSURE: 106 MMHG

## 2022-08-22 DIAGNOSIS — Z30.011 INITIATION OF ORAL CONTRACEPTION: Primary | ICD-10-CM

## 2022-08-22 PROCEDURE — 99213 OFFICE O/P EST LOW 20 MIN: CPT | Performed by: PHYSICIAN ASSISTANT

## 2022-08-22 RX ORDER — DROSPIRENONE AND ETHINYL ESTRADIOL 0.02-3(28)
1 KIT ORAL DAILY
Qty: 90 TABLET | Refills: 1 | Status: SHIPPED | OUTPATIENT
Start: 2022-08-22

## 2022-08-22 NOTE — PROGRESS NOTES
Griselda Romero  2000      CC: Birth control consultation    S:  25 y o  female here for birth control discussion  Is interested in starting birth control for pregnancy prevention, as she is going on Accutane  Periods are regular every 28-30 days, lasting 6 days  Reports light flow, changing regular sized pads/tampons q 3-4 hours  Reports dysmenorrhea mild, occurring premenstrually and first 1-2 days of flow, which she does not treat   Cyclic symptoms include none  LMP 7/24/22  Menarche at age 8    Sexually active currently: Yes - single partner - male  Has been with current partner for 3 years  Is currently using contraception: condoms   Hx STI: denies   Declines STD testing  Denies migraine with aura, HTN, CV ds, liver disease, personal hx or current dx of breast CA, or prior blood clot  No FH of clot formation  Nonsmoker  Is interested in starting Erika, which she reports was recommended by her dermatologist      We briefly reviewed availability of alternate pills, patches, NuvaRing, Depo Provera, IUDs and Nexplanon         Current Outpatient Medications:     drospirenone-ethinyl estradiol (ERIKA) 3-0 02 MG per tablet, Take 1 tablet by mouth daily, Disp: 90 tablet, Rfl: 1  Social History     Socioeconomic History    Marital status: Single     Spouse name: Not on file    Number of children: 0    Years of education: Not on file    Highest education level: Not on file   Occupational History    Not on file   Tobacco Use    Smoking status: Never Smoker    Smokeless tobacco: Never Used   Vaping Use    Vaping Use: Never used   Substance and Sexual Activity    Alcohol use: Never    Drug use: Never    Sexual activity: Yes     Partners: Male     Birth control/protection: Condom Male   Other Topics Concern    Not on file   Social History Narrative    Not on file     Social Determinants of Health     Financial Resource Strain: Not on file   Food Insecurity: Not on file   Transportation Needs: Not on file   Physical Activity: Not on file   Stress: Not on file   Social Connections: Not on file   Intimate Partner Violence: Not on file   Housing Stability: Not on file     Family History   Problem Relation Age of Onset    No Known Problems Mother     No Known Problems Father      No past medical history on file  Review of Systems   Respiratory: Negative  Cardiovascular: Negative  Gastrointestinal: Negative for constipation and diarrhea  Genitourinary: Negative for difficulty urinating, pelvic pain, vaginal bleeding, vaginal discharge, itching or odor  O:   Blood pressure 106/76, weight 87 1 kg (192 lb), last menstrual period 07/24/2022  Physical Exam   Constitutional: She appears well-developed and well-nourished  No acute distress  Head: Normocephalic atruamatic  Normal respiratory effort  Psychiatric: Normal mood, affect, and behavior   Neurological: Alert and oriented  No focal deficits  A/P:    1  Initiation of oral contraception    - drospirenone-ethinyl estradiol (ERIKA) 3-0 02 MG per tablet; Take 1 tablet by mouth daily  Dispense: 90 tablet; Refill: 1    · Compliance with daily pill taking reinforced  Reviewed management for missed pills  · Reviewed possibility for irregular bleeding in first 3 months of pill use  Can call with concerns  · Condoms recommended for STD prevention  · Warning sings of use reviewed  She will report these immediately should they occur  · Potential for drug interaction reviewed  Should notify all HCP of use to avoid dec efficacy     RTO in 3 months for pill check

## 2022-08-22 NOTE — PROGRESS NOTES
Patient here for birth control consult  She would like to use pills  She states she will be starting Accutane and was told she needs to be on birth control first   LMP 7/24/22  Periods are regular and last 6 days

## 2022-08-22 NOTE — PATIENT INSTRUCTIONS
Oral Contraception Instructions: You may start your pills today  If you miss 1 pill:  Take it as soon as you remember! You are still covered for birth control  This may mean you take 2 tablets at the same time, which is okay  If you miss 2 pills: You will take 2 pills one day, and then 2 pills the next day to get caught up  You are still covered for birth control  If you miss 3 pills: You now need to use barrier contraception (condoms) as your birth control pills will not be effective  Please start a new pack of pills today  You will likely have breakthrough bleeding  If you have any question please call the office  Remember, it may take up to 3 months for your body to adjust to a new birth control pill  Breakthrough bleeding is not uncommon  Birth Control Pills     Birth control pills  are also called oral contraceptives, or the pill  It is medicine that helps prevent pregnancy by stopping ovulation  Ovulation is when the ovaries make and release an egg cell each month  If this egg gets fertilized by sperm, pregnancy occurs  You will need to take the pill at the same time every day  Your healthcare provider will tell you when to start taking the pill  You will also be told what to do if you miss a dose  Instructions will depend on the kind of birth control pills you are taking  Different kinds of birth control pills:  Some kinds are taken for 21 days in a row, followed by 7 days of placebo (no hormones) pills  Other kinds are taken for 24 days followed by 4 days of placebos  Each kind has a certain amount of female hormones  Your provider will decide on the kind that is best for you based on your age and other health conditions  Call your local emergency number (911 in the [address] [place] [address],[address]) for any of the following:    You have any of the following signs of a stroke:      Numbness or drooping on one side of your face     Weakness in an arm or leg    Confusion or difficulty speaking    Dizziness, a severe headache, or vision loss    You feel lightheaded, short of breath, and have chest pain  You cough up blood  Seek care immediately if:   Your arm or leg feels warm, tender, and painful  It may look swollen and red  You have severe pain, numbness, or swelling in your arms or legs  Contact your healthcare provider if:   You have forgotten to take a birth control pill  You have mood changes, such as depression, since starting birth control pills  You have nausea or are vomiting  You have severe abdominal pain  You missed a period and have questions or concerns about being pregnant  You still have bleeding 4 months after taking birth control pills correctly  You have questions or concerns about your condition or care  What may be done before you can start taking birth control pills: You need to see your healthcare provider to get a prescription  Any of the following may be done before your healthcare provider gives you a prescription:  Your healthcare provider will ask about diseases and illnesses you have had in the past  Your provider will check your risk for blood clots, heart conditions, or stroke  Tell your provider if you had gastric bypass surgery  This surgery can affect the way your body absorbs medicines such as birth control pills  Your provider will also check your blood pressure, and may do a breast and pelvic exam  A Pap smear may also be done during the pelvic exam  This is a test to make sure you do not have abnormal changes on your cervix  You may need other tests, such as a urine test to make sure you are not pregnant  Your provider will ask if you take any medicines and if you smoke  Smoking increases your risk for stroke, heart attack, or a blood clot in your lungs  If you smoke, you should not take certain kinds of birth control pills      Advantages of birth control pills:  When birth control pills are used correctly, the chances of getting pregnant are very low  Birth control pills may help decrease bleeding and pain during your monthly period  They may also help prevent cancer of the uterus and ovaries  Disadvantages of birth control pills: You may have sudden changes in your mood or feelings while you take birth control pills  You may have nausea and a decreased sex drive  You may have an increased appetite and rapid weight gain  You may also have bleeding in between periods, less frequent periods, vaginal dryness, and breast pain  Birth control pills will not protect you from sexually transmitted infections  Rarely, some birth control pills can increase your risk for a blood clot  This may become life-threatening  If you decide you want to get pregnant: If you are planning to have a baby, ask your healthcare provider when you may stop taking your birth control pills  It may take some time for you to start ovulating again  Ask your healthcare provider for more information about pregnancy after birth control pills  When to start taking birth control pills after you have a baby: If you are not breastfeeding, you may start taking birth control pills 3 weeks after you give birth  You may be able to take certain types of birth control pills if you are breastfeeding  These pills can be started from 6 weeks to 6 months after you give birth  Ask your healthcare provider for more information about when to start taking birth control pills after you give birth  What you need to know about birth control pills and menopause:   Talk with your healthcare provider if you want to take birth control pills around menopause  Around age 39, you will enter into perimenopause  This means your hormone levels are dropping and you are ovulating less often  You can still become pregnant during this time  The risk for problems, such as miscarriage, are higher if you become pregnant after age 39   Birth control pills will prevent pregnancy, and may also help prevent or relieve some signs and symptoms of menopause  Examples are hot flashes and mood swings  Your provider will do tests when you are around age 48  The tests may show that you are in menopause  If the tests do not show menopause for sure, you may be able to continue taking the pill up to age 54  The decision will depend on your health and if you have any medical conditions, such as a blood clot  Do not smoke:  Nicotine and other chemicals in cigarettes and cigars increase your risk for a blood clot while you use birth control pills  The risk is higher if you are also 35 or older  Ask your healthcare provider for information if you currently smoke and need help to quit  E-cigarettes or smokeless tobacco still contain nicotine  Talk to your healthcare provider before you use these products  Follow up with your healthcare provider as directed:  Write down your questions so you remember to ask them during your visits  © Copyright [facility] [address] Information is for End User's use only and may not be sold, redistributed or otherwise used for commercial purposes  All illustrations and images included in CareNotes® are the copyrighted property of A D A M , Inc  or [address] [place], [place] [address]   The above information is an  only  It is not intended as medical advice for individual conditions or treatments  Talk to your doctor, nurse or pharmacist before following any medical regimen to see if it is safe and effective for you

## 2022-11-18 DIAGNOSIS — Z30.011 INITIATION OF ORAL CONTRACEPTION: Primary | ICD-10-CM

## 2022-11-18 RX ORDER — DROSPIRENONE AND ETHINYL ESTRADIOL 0.02-3(28)
KIT ORAL
Qty: 30 TABLET | Refills: 0 | Status: SHIPPED | OUTPATIENT
Start: 2022-11-18 | End: 2022-12-16

## 2022-11-18 NOTE — TELEPHONE ENCOUNTER
one month supply of ERIKA ordered (called ) to pharmacy on file  Pt has appt scheduled for Monday, 11/21/22 with JAVIER GillisC  Called pt- advised to call pharmacy prior to picking up medication  Routed to [facility]

## 2023-02-09 ENCOUNTER — OFFICE VISIT (OUTPATIENT)
Dept: OBGYN CLINIC | Facility: CLINIC | Age: 23
End: 2023-02-09

## 2023-02-09 VITALS — DIASTOLIC BLOOD PRESSURE: 78 MMHG | WEIGHT: 187.2 LBS | SYSTOLIC BLOOD PRESSURE: 108 MMHG | BODY MASS INDEX: 35.37 KG/M2

## 2023-02-09 DIAGNOSIS — Z30.41 ENCOUNTER FOR SURVEILLANCE OF CONTRACEPTIVE PILLS: Primary | ICD-10-CM

## 2023-02-09 RX ORDER — DROSPIRENONE AND ETHINYL ESTRADIOL 0.02-3(28)
1 KIT ORAL DAILY
Qty: 90 TABLET | Refills: 4 | Status: SHIPPED | OUTPATIENT
Start: 2023-02-09

## 2023-02-09 NOTE — PROGRESS NOTES
Griselda Romero  2000      Subjective:  25 y o  female here for pill check  She has been on Erika for the past 6 months  Started this with anticipation of going on Accutane  Decided not to complete Accutane and instead is trying Accuclear laser treatment  She has completed 2 of 3 doses and has seen some but not complete improvement  Thinking she may still need Accutane  She is doing well without irregular bleeding, cramping, breast tenderness, moodiness or increased acne  She has had no increase in headaches  She is very happy with this method and wishes to continue  Current Outpatient Medications:   •  drospirenone-ethinyl estradiol (ERIKA) 3-0 02 MG per tablet, Take 1 tablet by mouth daily, Disp: 90 tablet, Rfl: 4  Social History     Socioeconomic History   • Marital status: Single     Spouse name: Not on file   • Number of children: 0   • Years of education: Not on file   • Highest education level: Not on file   Occupational History   • Not on file   Tobacco Use   • Smoking status: Never   • Smokeless tobacco: Never   Vaping Use   • Vaping Use: Never used   Substance and Sexual Activity   • Alcohol use: Never   • Drug use: Never   • Sexual activity: Yes     Partners: Male     Birth control/protection: Condom Male, OCP   Other Topics Concern   • Not on file   Social History Narrative   • Not on file     Social Determinants of Health     Financial Resource Strain: Not on file   Food Insecurity: Not on file   Transportation Needs: Not on file   Physical Activity: Not on file   Stress: Not on file   Social Connections: Not on file   Intimate Partner Violence: Not on file   Housing Stability: Not on file     Family History   Problem Relation Age of Onset   • No Known Problems Mother    • No Known Problems Father      No past medical history on file  Review of Systems   Constitutional: Negative for fever, chills, sweats, fatigue, unexpected weight change  Eyes: Negative for visual changes  Respiratory: Negative for shortness of breath and cough   Cardiovascular:  Negative for chest pain, palpitations, swelling or calf pain   Gastrointestinal: Negative for abdominal pain or change in bowel habits  Genitourinary: Negative for difficulty urinating, pelvic pain, vaginal bleeding, vaginal discharge, itching or odor  Neurological:  Negative for headaches  Objective    Blood pressure 108/78, weight 84 9 kg (187 lb 3 2 oz), last menstrual period 01/30/2023  Physical Exam   Constitutional: She appears well-developed and well-nourished  No acute distress  Head: Normocephalic atruamatic  Pulmonary: Normal respiratory effort   Abdominal: Soft, nontender, nondistended  Psychiatric: Normal affect/behavior   Neurological: Alert and oriented  No focal deficits  Assessment:  1  Contraceptive management    Plan:    Happy with current OCP  Denies ACHES, breakthrough bleeding, nausea or other side effects  Refill given for one year  RTO in coming months for annual exam or prn problems or concerns

## 2023-05-08 ENCOUNTER — OFFICE VISIT (OUTPATIENT)
Dept: INTERNAL MEDICINE CLINIC | Facility: CLINIC | Age: 23
End: 2023-05-08

## 2023-05-08 VITALS
DIASTOLIC BLOOD PRESSURE: 80 MMHG | TEMPERATURE: 98.6 F | WEIGHT: 188.2 LBS | HEART RATE: 85 BPM | OXYGEN SATURATION: 99 % | BODY MASS INDEX: 35.53 KG/M2 | HEIGHT: 61 IN | SYSTOLIC BLOOD PRESSURE: 121 MMHG

## 2023-05-08 DIAGNOSIS — H61.23 BILATERAL IMPACTED CERUMEN: ICD-10-CM

## 2023-05-08 DIAGNOSIS — J02.9 SORE THROAT: Primary | ICD-10-CM

## 2023-05-08 RX ORDER — AMOXICILLIN 500 MG/1
500 CAPSULE ORAL EVERY 12 HOURS SCHEDULED
Qty: 20 CAPSULE | Refills: 0 | Status: SHIPPED | OUTPATIENT
Start: 2023-05-08 | End: 2023-05-18

## 2023-05-08 NOTE — PROGRESS NOTES
[facility]  INTERNAL MEDICINE OFFICE VISIT     PATIENT INFORMATION     Griselda Ruffin   21 y o  female   MRN: [id number]    ASSESSMENT/PLAN     Problem List Items Addressed This Visit        Other    Sore throat - Primary     Patient had URI 2 weeks ago and [facility]  She was given a prednisone back, tessalon pearls, and an albuterol inhaler that she stated helped  Patient states she has a continued sore throat  Cough drops help and the pain is better from initial illness two weeks ago  On exam, white spot present on right side near tonsil      -Will give amoxicillin 500 BID for 10 days  -Recommend to continue supportive care and call if symptoms worsen/do not get better          Relevant Medications    amoxicillin (AMOXIL) 500 mg capsule   Other Visit Diagnoses     Bilateral impacted cerumen        Relevant Medications    carbamide peroxide (DEBROX) 6 5 % otic solution        Schedule a follow-up appointment as needed  HEALTH MAINTENANCE     Immunization History   Administered Date(s) Administered   • Influenza, injectable, quadrivalent, preservative free 0 5 mL 10/13/2021   • Tdap 08/02/2021     CHIEF COMPLAINT     Chief Complaint   Patient presents with   • Sore Throat     2 weeks, swollen, red and painful      HISTORY OF PRESENT ILLNESS     Patient is a 22 y/o with no PMH presenting for sore throat  Patient states she had a URI two weeks ago with cough, congetion, headache, sore throat, and chest congestion  [facility] and was given a prednisone pack, albuterol inhaler, and tessalon pearls  Patient states she did feel better but her sore throat has persisted  REVIEW OF SYSTEMS     Review of Systems   Constitutional: Negative for activity change, fatigue and fever  HENT: Positive for sore throat  Negative for congestion  Respiratory: Negative for shortness of breath  Cardiovascular: Negative for chest pain and palpitations  "  Gastrointestinal: Negative for abdominal distention, abdominal pain, diarrhea and nausea  Neurological: Negative for dizziness and headaches  OBJECTIVE     Vitals:    05/08/23 1628   BP: 121/80   BP Location: Right arm   Patient Position: Sitting   Cuff Size: Standard   Pulse: 85   Temp: 98 6 °F (37 °C)   TempSrc: Temporal   SpO2: 99%   Weight: 85 4 kg (188 lb 3 2 oz)   Height: 5' 1\" (1 549 m)     Physical Exam  Vitals and nursing note reviewed  Constitutional:       General: She is not in acute distress  Appearance: She is well-developed  HENT:      Head: Normocephalic and atraumatic  Mouth/Throat:      Comments: White lesion below R tonsil   Eyes:      Conjunctiva/sclera: Conjunctivae normal    Cardiovascular:      Rate and Rhythm: Normal rate and regular rhythm  Heart sounds: No murmur heard  Pulmonary:      Effort: Pulmonary effort is normal  No respiratory distress  Breath sounds: Normal breath sounds  Abdominal:      Palpations: Abdomen is soft  Tenderness: There is no abdominal tenderness  Musculoskeletal:         General: No swelling  Cervical back: Neck supple  Skin:     General: Skin is warm and dry  Capillary Refill: Capillary refill takes less than 2 seconds  Neurological:      Mental Status: She is alert and oriented to person, place, and time  Psychiatric:         Mood and Affect: Mood normal        CURRENT MEDICATIONS     Current Outpatient Medications:   •  amoxicillin (AMOXIL) 500 mg capsule, Take 1 capsule (500 mg total) by mouth every 12 (twelve) hours for 10 days, Disp: 20 capsule, Rfl: 0  •  carbamide peroxide (DEBROX) 6 5 % otic solution, Administer 5 drops to the right ear 2 (two) times a day, Disp: 15 mL, Rfl: 0  •  drospirenone-ethinyl estradiol (ERIKA) 3-0 02 MG per tablet, Take 1 tablet by mouth daily, Disp: 90 tablet, Rfl: 4    PAST MEDICAL & SURGICAL HISTORY   History reviewed  No pertinent past medical history  History reviewed   No " pertinent surgical history  SOCIAL & FAMILY HISTORY     Social History     Socioeconomic History   • Marital status: Single     Spouse name: Not on file   • Number of children: 0   • Years of education: Not on file   • Highest education level: Not on file   Occupational History   • Not on file   Tobacco Use   • Smoking status: Never   • Smokeless tobacco: Never   Vaping Use   • Vaping Use: Never used   Substance and Sexual Activity   • Alcohol use: Never   • Drug use: Never   • Sexual activity: Yes     Partners: Male     Birth control/protection: Condom Male, OCP   Other Topics Concern   • Not on file   Social History Narrative   • Not on file     Social Determinants of Health     Financial Resource Strain: Low Risk    • Difficulty of Paying Living Expenses: Not hard at all   Food Insecurity: No Food Insecurity   • Worried About Running Out of Food in the Last Year: Never true   • Ran Out of Food in the Last Year: Never true   Transportation Needs: No Transportation Needs   • Lack of Transportation (Medical): No   • Lack of Transportation (Non-Medical): No   Physical Activity: Not on file   Stress: Not on file   Social Connections: Not on file   Intimate Partner Violence: Not on file   Housing Stability: Not on file     Social History     Substance and Sexual Activity   Alcohol Use Never     Substance and Sexual Activity   Alcohol Use Never        Substance and Sexual Activity   Drug Use Never     Social History     Tobacco Use   Smoking Status Never   Smokeless Tobacco Never     Family History   Problem Relation Age of Onset   • No Known Problems Mother    • No Known Problems Father      ==  Leonardo Madrigal MD  PGY-1  [facility]  Internal Medicine [address] , [address] [address] [facility], [address] [facility]  Office: [phone]  Fax: [phone]

## 2023-05-08 NOTE — ASSESSMENT & PLAN NOTE
Patient had URI 2 weeks ago and [facility]  She was given a prednisone back, tessalon pearls, and an albuterol inhaler that she stated helped  Patient states she has a continued sore throat  Cough drops help and the pain is better from initial illness two weeks ago  On exam, white spot present on right side near tonsil      -Will give amoxicillin 500 BID for 10 days  -Recommend to continue supportive care and call if symptoms worsen/do not get better

## 2023-06-21 ENCOUNTER — LAB REQUISITION (OUTPATIENT)
Dept: LAB | Facility: HOSPITAL | Age: 23
End: 2023-06-21
Payer: COMMERCIAL

## 2023-06-21 DIAGNOSIS — D48.5 NEOPLASM OF UNCERTAIN BEHAVIOR OF SKIN: ICD-10-CM

## 2023-06-21 PROCEDURE — 88305 TISSUE EXAM BY PATHOLOGIST: CPT | Performed by: PATHOLOGY

## 2023-06-29 PROCEDURE — 88305 TISSUE EXAM BY PATHOLOGIST: CPT | Performed by: PATHOLOGY

## 2023-10-30 ENCOUNTER — HOSPITAL ENCOUNTER (EMERGENCY)
Facility: HOSPITAL | Age: 23
Discharge: HOME | End: 2023-10-30
Attending: EMERGENCY MEDICINE | Admitting: EMERGENCY MEDICINE
Payer: COMMERCIAL

## 2023-10-30 VITALS
RESPIRATION RATE: 18 BRPM | HEART RATE: 81 BPM | OXYGEN SATURATION: 97 % | SYSTOLIC BLOOD PRESSURE: 145 MMHG | DIASTOLIC BLOOD PRESSURE: 87 MMHG | TEMPERATURE: 98.3 F

## 2023-10-30 DIAGNOSIS — R30.0 DYSURIA: Primary | ICD-10-CM

## 2023-10-30 LAB
BILIRUB UR QL STRIP: NEGATIVE
BILIRUB UR QL STRIP: NEGATIVE
CLARITY UR: CLEAR
CLARITY UR: CLEAR
COLOR UR: NORMAL
COLOR UR: YELLOW
EXT PREGNANCY TEST URINE: NEGATIVE
EXT. CONTROL: NORMAL
GLUCOSE UR STRIP-MCNC: NEGATIVE MG/DL
GLUCOSE UR STRIP-MCNC: NEGATIVE MG/DL
HGB UR QL STRIP.AUTO: NEGATIVE
HGB UR QL STRIP.AUTO: NEGATIVE
KETONES UR STRIP-MCNC: NEGATIVE MG/DL
KETONES UR STRIP-MCNC: NEGATIVE MG/DL
LEUKOCYTE ESTERASE UR QL STRIP: NEGATIVE
LEUKOCYTE ESTERASE UR QL STRIP: NEGATIVE
NITRITE UR QL STRIP: NEGATIVE
NITRITE UR QL STRIP: NEGATIVE
PH UR STRIP.AUTO: 7.5 [PH]
PH UR STRIP.AUTO: 7.5 [PH] (ref 4.5–8)
PROT UR STRIP-MCNC: ABNORMAL MG/DL
PROT UR STRIP-MCNC: NEGATIVE MG/DL
SP GR UR STRIP.AUTO: 1.02 (ref 1–1.03)
SP GR UR STRIP.AUTO: 1.02 (ref 1–1.03)
UROBILINOGEN UR QL STRIP.AUTO: 0.2 E.U./DL
UROBILINOGEN UR STRIP-ACNC: <2 MG/DL

## 2023-10-30 PROCEDURE — 81003 URINALYSIS AUTO W/O SCOPE: CPT

## 2023-10-30 PROCEDURE — 99284 EMERGENCY DEPT VISIT MOD MDM: CPT | Performed by: EMERGENCY MEDICINE

## 2023-10-30 PROCEDURE — 81025 URINE PREGNANCY TEST: CPT

## 2023-10-30 PROCEDURE — 99283 EMERGENCY DEPT VISIT LOW MDM: CPT

## 2023-10-30 RX ORDER — PHENAZOPYRIDINE HYDROCHLORIDE 100 MG/1
100 TABLET, FILM COATED ORAL ONCE
Status: COMPLETED | OUTPATIENT
Start: 2023-10-30 | End: 2023-10-30

## 2023-10-30 RX ORDER — NITROFURANTOIN 25; 75 MG/1; MG/1
100 CAPSULE ORAL ONCE
Status: COMPLETED | OUTPATIENT
Start: 2023-10-30 | End: 2023-10-30

## 2023-10-30 RX ORDER — NITROFURANTOIN 25; 75 MG/1; MG/1
100 CAPSULE ORAL 2 TIMES DAILY
Qty: 10 CAPSULE | Refills: 0 | Status: SHIPPED | OUTPATIENT
Start: 2023-10-30 | End: 2023-11-03 | Stop reason: ALTCHOICE

## 2023-10-30 RX ORDER — PHENAZOPYRIDINE HYDROCHLORIDE 200 MG/1
200 TABLET, FILM COATED ORAL 3 TIMES DAILY
Qty: 6 TABLET | Refills: 0 | Status: SHIPPED | OUTPATIENT
Start: 2023-10-30 | End: 2023-11-03 | Stop reason: ALTCHOICE

## 2023-10-30 RX ADMIN — NITROFURANTOIN MONOHYDRATE/MACROCRYSTALS 100 MG: 25; 75 CAPSULE ORAL at 16:49

## 2023-10-30 RX ADMIN — PHENAZOPYRIDINE 100 MG: 100 TABLET ORAL at 16:49

## 2023-10-30 NOTE — ED ATTENDING ATTESTATION
10/30/2023  I, Tootie Justice MD, saw and evaluated the patient. I have discussed the patient with the resident/non-physician practitioner and agree with the resident's/non-physician practitioner's findings, Plan of Care, and MDM as documented in the resident's/non-physician practitioner's note, except where noted. All available labs and Radiology studies were reviewed. I was present for key portions of any procedure(s) performed by the resident/non-physician practitioner and I was immediately available to provide assistance. At this point I agree with the current assessment done in the Emergency Department. I have conducted an independent evaluation of this patient a history and physical is as follows:    ED Course         Critical Care Time  Procedures    20 yo old female with pelvic pain for one week. Pt with similar pain few years ago with uti. Pt having no fever, no back pain. Pt with dysuria, burning with urination. No n/v. Pt with no vaginal discharge, no vaginal bleeding. Vss, afebrile, lungs cta, rrr, abdomen soft nontender. No cva tenderness. Urine, urine preg.

## 2023-10-30 NOTE — Clinical Note
Kacey Grant was seen and treated in our emergency department on 10/30/2023. No restrictions            Diagnosis:     Griselda  is off the rest of the shift today, may return to work on return date. She may return on this date: 10/31/2023         If you have any questions or concerns, please don't hesitate to call.       Kassy Manjarrez MD    ______________________________           _______________          _______________  LAURA DELATORRE SHANTHI [facility] Representative                              Date                                Time

## 2023-10-30 NOTE — DISCHARGE INSTRUCTIONS
You were seen in the Emergency Department today for pain with urination. A prescription for antibiotics and pyridium were provided, please take as prescribed. Please follow up with your primary care doctor in a week for re-evaluation. Please return to the Emergency Department if you experience worsening of your current symptoms or any other concerning symptoms including fever, abdominal pain, uncontrolled vomiting.

## 2023-10-31 NOTE — ED PROVIDER NOTES
History  Chief Complaint   Patient presents with    Pelvic Pain     Starting last week, pelvic pain and painful urination. HPI    Patient is a 21 y.o. female with no significant PMHx  who presents to the ED via private vehicle for evaluation of pelvic pain x 1 week. Patient also endorses dysuria and increased urinary urgency. Denies fever, chills, N/V/D, back pain, abnormal vaginal discharge, vaginal bleeding. LMP was 2 weeks ago. Currently sexually active and uses condoms. Reports similar symptoms with previous UTI. Prior to Admission Medications   Prescriptions Last Dose Informant Patient Reported? Taking?   carbamide peroxide (DEBROX) 6.5 % otic solution   No No   Sig: Administer 5 drops to the right ear 2 (two) times a day   drospirenone-ethinyl estradiol (ERIKA) 3-0.02 MG per tablet   No No   Sig: Take 1 tablet by mouth daily      Facility-Administered Medications: None       History reviewed. No pertinent past medical history. History reviewed. No pertinent surgical history. Family History   Problem Relation Age of Onset    No Known Problems Mother     No Known Problems Father      I have reviewed and agree with the history as documented. E-Cigarette/Vaping    E-Cigarette Use Never User      E-Cigarette/Vaping Substances    Nicotine No     THC No     CBD No     Flavoring No     Other No     Unknown No      Social History     Tobacco Use    Smoking status: Never    Smokeless tobacco: Never   Vaping Use    Vaping Use: Never used   Substance Use Topics    Alcohol use: Never    Drug use: Never        Review of Systems   Genitourinary:  Positive for dysuria, pelvic pain and urgency.        Physical Exam  ED Triage Vitals   Temperature Pulse Respirations Blood Pressure SpO2   10/30/23 1603 10/30/23 1604 10/30/23 1604 10/30/23 1604 10/30/23 1604   98.3 °F (36.8 °C) 81 18 145/87 97 %      Temp src Heart Rate Source Patient Position - Orthostatic VS BP Location FiO2 (%)   -- 10/30/23 1604 -- 10/30/23 1604 --    Monitor  Right arm       Pain Score       10/30/23 1604       No Pain             Orthostatic Vital Signs  Vitals:    10/30/23 1604   BP: 145/87   Pulse: 81       Physical Exam  Vitals and nursing note reviewed. Constitutional:       General: She is not in acute distress. Appearance: Normal appearance. She is well-developed. She is not ill-appearing, toxic-appearing or diaphoretic. HENT:      Head: Normocephalic and atraumatic. Eyes:      Conjunctiva/sclera: Conjunctivae normal.   Cardiovascular:      Rate and Rhythm: Normal rate and regular rhythm. Heart sounds: No murmur heard. Pulmonary:      Effort: Pulmonary effort is normal. No respiratory distress. Breath sounds: Normal breath sounds. Abdominal:      Palpations: Abdomen is soft. Tenderness: There is no abdominal tenderness. Musculoskeletal:         General: No swelling. Cervical back: Neck supple. Skin:     General: Skin is warm and dry. Capillary Refill: Capillary refill takes less than 2 seconds. Neurological:      General: No focal deficit present. Mental Status: She is alert and oriented to person, place, and time.    Psychiatric:         Mood and Affect: Mood normal.         ED Medications  Medications   phenazopyridine (PYRIDIUM) tablet 100 mg (100 mg Oral Given 10/30/23 1649)   nitrofurantoin (MACROBID) extended-release capsule 100 mg (100 mg Oral Given 10/30/23 1649)       Diagnostic Studies  Results Reviewed       Procedure Component Value Units Date/Time    UA w Reflex to Microscopic w Reflex to Culture [[id number]] Collected: 10/30/23 1647    Lab Status: Final result Specimen: Urine, Other Updated: 10/30/23 1658     Color, UA Light Yellow     Clarity, UA Clear     Specific Gravity, UA 1.020     pH, UA 7.5     Leukocytes, UA Negative     Nitrite, UA Negative     Protein, UA Negative mg/dl      Glucose, UA Negative mg/dl      Ketones, UA Negative mg/dl      Urobilinogen, UA <2.0 mg/dl Bilirubin, UA Negative     Occult Blood, UA Negative    POCT pregnancy, urine [[id number]]  (Normal) Resulted: 10/30/23 1638    Lab Status: Final result Updated: 10/30/23 1639     EXT Preg Test, Ur Negative     Control Valid    Urine Macroscopic, POC [[id number]]  (Abnormal) Collected: 10/30/23 1636    Lab Status: Final result Specimen: Urine Updated: 10/30/23 1637     Color, UA Yellow     Clarity, UA Clear     pH, UA 7.5     Leukocytes, UA Negative     Nitrite, UA Negative     Protein, UA Trace mg/dl      Glucose, UA Negative mg/dl      Ketones, UA Negative mg/dl      Urobilinogen, UA 0.2 E.U./dl      Bilirubin, UA Negative     Occult Blood, UA Negative     Specific Gravity, UA 1.025    Narrative:      CLINITEK RESULT                   No orders to display         Procedures  Procedures      ED Course                                         Medical Decision Making  Amount and/or Complexity of Data Reviewed  Labs: ordered. Risk  Prescription drug management. ASSESSMENT: Patient is a 21 y.o. female who presents with pelvic pain, dysuria, urinary urgency x1 week . DDX includes but not limited to: cystitis. PLAN: urine pregnancy, UA. Treated with pyridium and macrobid. Stable for discharge. Strict return to ED precautions provided. Advised to follow up with PCP within 1 week for re-evaluation and further management. Patient verbalized understanding and agrees with the plan of care. Disposition  Final diagnoses:   Dysuria     Time reflects when diagnosis was documented in both MDM as applicable and the Disposition within this note       Time User Action Codes Description Comment    10/30/2023  4:45 PM Nenita Castellanos Add [R30.0] Dysuria           ED Disposition       ED Disposition   Discharge    Condition   Stable    Date/Time   Mon Oct 30, 2023  4:45 PM    Comment   Sandip Pulliam discharge to home/self care.                    Follow-up Information       Follow up With Specialties Details Why Contact Info Additional [address] [facility] Emergency Department Emergency Medicine  If symptoms worsen [address] [phone]  [facility] Emergency Department, [address] [facility], [place], [place], [address] [place] [id number]             Discharge Medication List as of 10/30/2023  4:50 PM        START taking these medications    Details   nitrofurantoin (MACROBID) 100 mg capsule Take 1 capsule (100 mg total) by mouth 2 (two) times a day for 5 days, Starting Mon 10/30/2023, Until Sat 11/4/2023, Normal      phenazopyridine (PYRIDIUM) 200 mg tablet Take 1 tablet (200 mg total) by mouth 3 (three) times a day, Starting Mon 10/30/2023, Normal           CONTINUE these medications which have NOT CHANGED    Details   carbamide peroxide (DEBROX) 6.5 % otic solution Administer 5 drops to the right ear 2 (two) times a day, Starting Mon 5/8/2023, Normal      drospirenone-ethinyl estradiol (ERIKA) 3-0.02 MG per tablet Take 1 tablet by mouth daily, Starting Thu 2/9/2023, Normal           No discharge procedures on file. PDMP Review       None             ED Provider  Attending physically available and evaluated Nhan Milner. I managed the patient along with the ED Attending.     Electronically Signed by           Mary Euceda MD  10/31/23 3237

## 2023-11-03 ENCOUNTER — OFFICE VISIT (OUTPATIENT)
Dept: INTERNAL MEDICINE CLINIC | Facility: CLINIC | Age: 23
End: 2023-11-03

## 2023-11-03 VITALS
SYSTOLIC BLOOD PRESSURE: 120 MMHG | TEMPERATURE: 97.8 F | BODY MASS INDEX: 32.66 KG/M2 | WEIGHT: 173 LBS | HEIGHT: 61 IN | DIASTOLIC BLOOD PRESSURE: 85 MMHG | HEART RATE: 74 BPM

## 2023-11-03 DIAGNOSIS — R42 DIZZINESS: Primary | ICD-10-CM

## 2023-11-03 PROCEDURE — 99214 OFFICE O/P EST MOD 30 MIN: CPT | Performed by: PHYSICIAN ASSISTANT

## 2023-11-06 PROBLEM — M54.50 RECURRENT LOW BACK PAIN: Status: RESOLVED | Noted: 2021-10-13 | Resolved: 2023-11-06

## 2023-11-06 PROBLEM — R42 DIZZINESS: Status: ACTIVE | Noted: 2023-11-06

## 2023-11-06 PROBLEM — R29.818 SUSPECTED SLEEP APNEA: Status: RESOLVED | Noted: 2022-05-04 | Resolved: 2023-11-06

## 2023-11-06 PROBLEM — J02.9 SORE THROAT: Status: RESOLVED | Noted: 2023-05-08 | Resolved: 2023-11-06

## 2023-11-06 PROBLEM — E66.09 CLASS 1 OBESITY DUE TO EXCESS CALORIES WITHOUT SERIOUS COMORBIDITY WITH BODY MASS INDEX (BMI) OF 30.0 TO 30.9 IN ADULT: Status: RESOLVED | Noted: 2022-05-04 | Resolved: 2023-11-06

## 2023-11-06 PROBLEM — E66.811 CLASS 1 OBESITY DUE TO EXCESS CALORIES WITHOUT SERIOUS COMORBIDITY WITH BODY MASS INDEX (BMI) OF 30.0 TO 30.9 IN ADULT: Status: RESOLVED | Noted: 2022-05-04 | Resolved: 2023-11-06

## 2023-11-06 NOTE — PROGRESS NOTES
Name: Elenor Schaumann      : 2000      MRN: [id number]  Encounter Provider: Thang Madera PA-C  Encounter Date: 11/3/2023   Encounter department: [address]     1. Dizziness  Assessment & Plan:  Discussed possible causes. Recommend increasing hydration, adequate rest and sleep every night. Encouraged healthy well balanced diet throughout the day. Try not to skip meals. Avoid alcohol and caffeine. ER precautions given. Will evaluate further with lab work. Follow up pending results. Orders:  -     CBC and differential; Future  -     Comprehensive metabolic panel; Future  -     TSH, 3rd generation with Free T4 reflex; Future  -     HEMOGLOBIN A1C W/ EAG ESTIMATION; Future  -     Vitamin B12; Future  -     Iron Panel (Includes Ferritin, Iron Sat%, Iron, and TIBC); Future        Depression Screening and Follow-up Plan: Patient was screened for depression during today's encounter. They screened negative with a PHQ-2 score of 1. Subjective      Patient is a 21year old female presenting with complaints of dizziness. She states it started 2 weeks ago. It have not occurred every day since, she is not sure how often. Some days it occurs more than once. It lasts seconds at a time. Worse with turning head and switching positions. States once she squatted down and when she got up she was dizzy. States it is a mixture of room spinning and lightheadedness. Denies nausea and vomiting. Denies headaches. Denies visual changes. Denies numbness, tingling, or weakness. She does often go a long period of time throughout the day without eating. She does exercise regularly. Drinks 1-2 cups of coffee a day. Denies alcohol use of illicit drugs. Admits she doesn't drink enough water or always sleep at least 7 hours a night. Denies heavy or prolonged periods. She has not tried anything for this yet.        Review of Systems   Constitutional:  Negative for appetite change, chills, diaphoresis, fatigue, fever and unexpected weight change. HENT:  Negative for congestion, ear discharge, ear pain, hearing loss, postnasal drip, rhinorrhea, sinus pain, sneezing, sore throat, tinnitus and trouble swallowing. Eyes:  Negative for visual disturbance. Respiratory:  Negative for cough, chest tightness, shortness of breath and wheezing. Cardiovascular:  Negative for chest pain, palpitations and leg swelling. Gastrointestinal:  Negative for abdominal pain, blood in stool, constipation, diarrhea, nausea and vomiting. Endocrine: Negative for cold intolerance, heat intolerance, polydipsia, polyphagia and polyuria. Musculoskeletal:  Negative for arthralgias and myalgias. Skin:  Negative for rash. Neurological:  Positive for dizziness and light-headedness. Negative for tremors, weakness, numbness and headaches. Hematological:  Negative for adenopathy. Psychiatric/Behavioral:  Negative for dysphoric mood, self-injury, sleep disturbance and suicidal ideas. The patient is not nervous/anxious. Current Outpatient Medications on File Prior to Visit   Medication Sig    drospirenone-ethinyl estradiol (ERIKA) 3-0.02 MG per tablet Take 1 tablet by mouth daily       Objective     /85 (BP Location: Right arm, Patient Position: Sitting, Cuff Size: Large)   Pulse 74   Temp 97.8 °F (36.6 °C) (Temporal)   Ht 5' 1" (1.549 m)   Wt 78.5 kg (173 lb)   LMP 10/02/2023 (Approximate)   BMI 32.69 kg/m²     Physical Exam  Vitals and nursing note reviewed. Constitutional:       General: She is awake. She is not in acute distress. Appearance: Normal appearance. She is well-developed and well-groomed. She is obese. She is not ill-appearing. HENT:      Head: Normocephalic and atraumatic.       Right Ear: Tympanic membrane, ear canal and external ear normal.      Left Ear: Tympanic membrane, ear canal and external ear normal.      Nose: Nose normal. No congestion or rhinorrhea. Mouth/Throat:      Mouth: Mucous membranes are moist.      Pharynx: Oropharynx is clear. No oropharyngeal exudate or posterior oropharyngeal erythema. Eyes:      General: No scleral icterus. Extraocular Movements: Extraocular movements intact. Right eye: Normal extraocular motion and no nystagmus. Left eye: Normal extraocular motion and no nystagmus. Conjunctiva/sclera: Conjunctivae normal.      Pupils: Pupils are equal, round, and reactive to light. Comments: Negative bilateral dat hallpike   Cardiovascular:      Rate and Rhythm: Normal rate and regular rhythm. Pulses: Normal pulses. Heart sounds: Normal heart sounds. No murmur heard. Pulmonary:      Effort: Pulmonary effort is normal. No respiratory distress. Breath sounds: Normal breath sounds and air entry. No decreased air movement. No decreased breath sounds, wheezing, rhonchi or rales. Abdominal:      General: Abdomen is flat. Bowel sounds are normal. There is no distension. Palpations: Abdomen is soft. There is no mass. Tenderness: There is no abdominal tenderness. There is no guarding or rebound. Hernia: No hernia is present. Musculoskeletal:         General: Normal range of motion. Cervical back: Neck supple. Right lower leg: No edema. Left lower leg: No edema. Lymphadenopathy:      Cervical: No cervical adenopathy. Skin:     General: Skin is warm. Coloration: Skin is not jaundiced. Findings: No rash. Neurological:      General: No focal deficit present. Mental Status: She is alert and oriented to person, place, and time. Mental status is at baseline. Cranial Nerves: Cranial nerves 2-12 are intact. Sensory: Sensation is intact. Motor: Motor function is intact. Coordination: Coordination is intact. Gait: Gait is intact. Comments: Upper and lower extremity strength 5/5, equal and symmetric. Normal tone. Psychiatric:         Attention and Perception: Attention normal.         Mood and Affect: Mood and affect normal.         Speech: Speech normal.         Behavior: Behavior normal. Behavior is cooperative.        Ana Deluca PA-C

## 2023-11-06 NOTE — ASSESSMENT & PLAN NOTE
Discussed possible causes. Recommend increasing hydration, adequate rest and sleep every night. Encouraged healthy well balanced diet throughout the day. Try not to skip meals. Avoid alcohol and caffeine. ER precautions given. Will evaluate further with lab work. Follow up pending results.

## 2023-12-15 ENCOUNTER — APPOINTMENT (OUTPATIENT)
Dept: LAB | Facility: CLINIC | Age: 23
End: 2023-12-15
Payer: COMMERCIAL

## 2023-12-15 ENCOUNTER — OFFICE VISIT (OUTPATIENT)
Dept: INTERNAL MEDICINE CLINIC | Facility: CLINIC | Age: 23
End: 2023-12-15

## 2023-12-15 VITALS
DIASTOLIC BLOOD PRESSURE: 84 MMHG | SYSTOLIC BLOOD PRESSURE: 130 MMHG | WEIGHT: 168.2 LBS | HEART RATE: 80 BPM | BODY MASS INDEX: 31.78 KG/M2

## 2023-12-15 DIAGNOSIS — Z11.4 SCREENING FOR HIV (HUMAN IMMUNODEFICIENCY VIRUS): ICD-10-CM

## 2023-12-15 DIAGNOSIS — Z11.59 ENCOUNTER FOR HEPATITIS C SCREENING TEST FOR LOW RISK PATIENT: ICD-10-CM

## 2023-12-15 DIAGNOSIS — Z00.00 ANNUAL PHYSICAL EXAM: Primary | ICD-10-CM

## 2023-12-15 DIAGNOSIS — R42 DIZZINESS: ICD-10-CM

## 2023-12-15 LAB
ALBUMIN SERPL BCP-MCNC: 4.7 G/DL (ref 3.5–5)
ALP SERPL-CCNC: 58 U/L (ref 34–104)
ALT SERPL W P-5'-P-CCNC: 10 U/L (ref 7–52)
ANION GAP SERPL CALCULATED.3IONS-SCNC: 8 MMOL/L
AST SERPL W P-5'-P-CCNC: 17 U/L (ref 13–39)
BASOPHILS # BLD AUTO: 0.04 THOUSANDS/ÂΜL (ref 0–0.1)
BASOPHILS NFR BLD AUTO: 1 % (ref 0–1)
BILIRUB SERPL-MCNC: 0.41 MG/DL (ref 0.2–1)
BUN SERPL-MCNC: 10 MG/DL (ref 5–25)
CALCIUM SERPL-MCNC: 9.4 MG/DL (ref 8.4–10.2)
CHLORIDE SERPL-SCNC: 103 MMOL/L (ref 96–108)
CO2 SERPL-SCNC: 26 MMOL/L (ref 21–32)
CREAT SERPL-MCNC: 0.68 MG/DL (ref 0.6–1.3)
EOSINOPHIL # BLD AUTO: 0.07 THOUSAND/ÂΜL (ref 0–0.61)
EOSINOPHIL NFR BLD AUTO: 1 % (ref 0–6)
ERYTHROCYTE [DISTWIDTH] IN BLOOD BY AUTOMATED COUNT: 13.6 % (ref 11.6–15.1)
EST. AVERAGE GLUCOSE BLD GHB EST-MCNC: 108 MG/DL
FERRITIN SERPL-MCNC: 20 NG/ML (ref 11–307)
GFR SERPL CREATININE-BSD FRML MDRD: 123 ML/MIN/1.73SQ M
GLUCOSE P FAST SERPL-MCNC: 69 MG/DL (ref 65–99)
HBA1C MFR BLD: 5.4 %
HCT VFR BLD AUTO: 45 % (ref 34.8–46.1)
HCV AB SER QL: NORMAL
HGB BLD-MCNC: 14.1 G/DL (ref 11.5–15.4)
HIV 1+2 AB+HIV1 P24 AG SERPL QL IA: NORMAL
HIV 2 AB SERPL QL IA: NORMAL
HIV1 AB SERPL QL IA: NORMAL
HIV1 P24 AG SERPL QL IA: NORMAL
IMM GRANULOCYTES # BLD AUTO: 0.02 THOUSAND/UL (ref 0–0.2)
IMM GRANULOCYTES NFR BLD AUTO: 0 % (ref 0–2)
IRON SATN MFR SERPL: 21 % (ref 15–50)
IRON SERPL-MCNC: 69 UG/DL (ref 50–212)
LYMPHOCYTES # BLD AUTO: 2.17 THOUSANDS/ÂΜL (ref 0.6–4.47)
LYMPHOCYTES NFR BLD AUTO: 32 % (ref 14–44)
MCH RBC QN AUTO: 27.3 PG (ref 26.8–34.3)
MCHC RBC AUTO-ENTMCNC: 31.3 G/DL (ref 31.4–37.4)
MCV RBC AUTO: 87 FL (ref 82–98)
MONOCYTES # BLD AUTO: 0.49 THOUSAND/ÂΜL (ref 0.17–1.22)
MONOCYTES NFR BLD AUTO: 7 % (ref 4–12)
NEUTROPHILS # BLD AUTO: 3.92 THOUSANDS/ÂΜL (ref 1.85–7.62)
NEUTS SEG NFR BLD AUTO: 59 % (ref 43–75)
NRBC BLD AUTO-RTO: 0 /100 WBCS
PLATELET # BLD AUTO: 275 THOUSANDS/UL (ref 149–390)
PMV BLD AUTO: 13 FL (ref 8.9–12.7)
POTASSIUM SERPL-SCNC: 4 MMOL/L (ref 3.5–5.3)
PROT SERPL-MCNC: 7.8 G/DL (ref 6.4–8.4)
RBC # BLD AUTO: 5.16 MILLION/UL (ref 3.81–5.12)
SODIUM SERPL-SCNC: 137 MMOL/L (ref 135–147)
TIBC SERPL-MCNC: 332 UG/DL (ref 250–450)
TSH SERPL DL<=0.05 MIU/L-ACNC: 1.66 UIU/ML (ref 0.45–4.5)
UIBC SERPL-MCNC: 263 UG/DL (ref 155–355)
VIT B12 SERPL-MCNC: 206 PG/ML (ref 180–914)
WBC # BLD AUTO: 6.71 THOUSAND/UL (ref 4.31–10.16)

## 2023-12-15 PROCEDURE — 87389 HIV-1 AG W/HIV-1&-2 AB AG IA: CPT

## 2023-12-15 PROCEDURE — 83540 ASSAY OF IRON: CPT

## 2023-12-15 PROCEDURE — 99395 PREV VISIT EST AGE 18-39: CPT | Performed by: INTERNAL MEDICINE

## 2023-12-15 PROCEDURE — 82607 VITAMIN B-12: CPT

## 2023-12-15 PROCEDURE — 83550 IRON BINDING TEST: CPT

## 2023-12-15 PROCEDURE — 83036 HEMOGLOBIN GLYCOSYLATED A1C: CPT

## 2023-12-15 PROCEDURE — 36415 COLL VENOUS BLD VENIPUNCTURE: CPT

## 2023-12-15 PROCEDURE — 85025 COMPLETE CBC W/AUTO DIFF WBC: CPT

## 2023-12-15 PROCEDURE — 84443 ASSAY THYROID STIM HORMONE: CPT

## 2023-12-15 PROCEDURE — 82728 ASSAY OF FERRITIN: CPT

## 2023-12-15 PROCEDURE — 80053 COMPREHEN METABOLIC PANEL: CPT

## 2023-12-15 PROCEDURE — 86803 HEPATITIS C AB TEST: CPT
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
## 2023-12-15 NOTE — PATIENT INSTRUCTIONS
Get blood work done when you are able to. Follow up with OB/GYN and dentistry as scheduled. Wellness Visit for Adults   AMBULATORY CARE:   A wellness visit  is when you see your healthcare provider to get screened for health problems. Your healthcare provider will also give you advice on how to stay healthy. Write down your questions so you remember to ask them. Ask your healthcare provider how often you should have a wellness visit. What happens at a wellness visit:  Your healthcare provider will ask about your health, and your family history of health problems. This includes high blood pressure, heart disease, and cancer. He or she will ask if you have symptoms that concern you, if you smoke, and about your mood. You may also be asked about your intake of medicines, supplements, food, and alcohol. Any of the following may be done: Your weight  will be checked. Your height may also be checked so your body mass index (BMI) can be calculated. Your BMI shows if you are at a healthy weight. Your blood pressure  and heart rate will be checked. Your temperature may also be checked. Blood and urine tests  may be done. Blood tests may be done to check your cholesterol levels. Abnormal cholesterol levels increase your risk for heart disease and stroke. You may also need a blood or urine test to check for diabetes if you are at increased risk. Urine tests may be done to look for signs of an infection or kidney disease. A physical exam  includes checking your heartbeat and lungs with a stethoscope. Your healthcare provider may also check your skin to look for sun damage. Screening tests  may be recommended. A screening test is done to check for diseases that may not cause symptoms. The screening tests you may need depend on your age, gender, family history, and lifestyle habits. For example, colorectal screening may be recommended if you are 48years old or older.     Screening tests you need if you are a woman:   A Pap smear  is used to screen for cervical cancer. Pap smears are usually done every 3 to 5 years depending on your age. You may need them more often if you have had abnormal Pap smear test results in the past. Ask your healthcare provider how often you should have a Pap smear. A mammogram  is an x-ray of your breasts to screen for breast cancer. Experts recommend mammograms every 2 years starting at age 48 years. You may need a mammogram at age 52 years or younger if you have an increased risk for breast cancer. Talk to your healthcare provider about when you should start having mammograms and how often you need them. Vaccines you may need:   Get an influenza vaccine  every year. The influenza vaccine protects you from the flu. Several types of viruses cause the flu. The viruses change over time, so new vaccines are made each year. Get a tetanus-diphtheria (Td) booster vaccine  every 10 years. This vaccine protects you against tetanus and diphtheria. Tetanus is a severe infection that may cause painful muscle spasms and lockjaw. Diphtheria is a severe bacterial infection that causes a thick covering in the back of your mouth and throat. Get a human papillomavirus (HPV) vaccine  if you are female and aged 23 to 32 or male 23 to 24 and never received it. This vaccine protects you from HPV infection. HPV is the most common infection spread by sexual contact. HPV may also cause vaginal, penile, and anal cancers. Get a pneumococcal vaccine  if you are aged 72 years or older. The pneumococcal vaccine is an injection given to protect you from pneumococcal disease. Pneumococcal disease is an infection caused by pneumococcal bacteria. The infection may cause pneumonia, meningitis, or an ear infection. Get a shingles vaccine  if you are 60 or older, even if you have had shingles before. The shingles vaccine is an injection to protect you from the varicella-zoster virus.  This is the same virus that causes chickenpox. Shingles is a painful rash that develops in people who had chickenpox or have been exposed to the virus. How to eat healthy:  My Plate is a model for planning healthy meals. It shows the types and amounts of foods that should go on your plate. Fruits and vegetables make up about half of your plate, and grains and protein make up the other half. A serving of dairy is included on the side of your plate. The amount of calories and serving sizes you need depends on your age, gender, weight, and height. Examples of healthy foods are listed below:  Eat a variety of vegetables  such as dark green, red, and orange vegetables. You can also include canned vegetables low in sodium (salt) and frozen vegetables without added butter or sauces. Eat a variety of fresh fruits , canned fruit in 100% juice, frozen fruit, and dried fruit. Include whole grains. At least half of the grains you eat should be whole grains. Examples include whole-wheat bread, wheat pasta, brown rice, and whole-grain cereals such as oatmeal.    Eat a variety of protein foods such as seafood (fish and shellfish), lean meat, and poultry without skin (turkey and chicken). Examples of lean meats include pork leg, shoulder, or tenderloin, and beef round, sirloin, tenderloin, and extra lean ground beef. Other protein foods include eggs and egg substitutes, beans, peas, soy products, nuts, and seeds. Choose low-fat dairy products such as skim or 1% milk or low-fat yogurt, cheese, and cottage cheese. Limit unhealthy fats  such as butter, hard margarine, and shortening. Exercise:  Exercise at least 30 minutes per day on most days of the week. Some examples of exercise include walking, biking, dancing, and swimming. You can also fit in more physical activity by taking the stairs instead of the elevator or parking farther away from stores. Include muscle strengthening activities 2 days each week.  Regular exercise provides many health benefits. It helps you manage your weight, and decreases your risk for type 2 diabetes, heart disease, stroke, and high blood pressure. Exercise can also help improve your mood. Ask your healthcare provider about the best exercise plan for you. General health and safety guidelines:   Do not smoke. Nicotine and other chemicals in cigarettes and cigars can cause lung damage. Ask your healthcare provider for information if you currently smoke and need help to quit. E-cigarettes or smokeless tobacco still contain nicotine. Talk to your healthcare provider before you use these products. Limit alcohol. A drink of alcohol is 12 ounces of beer, 5 ounces of wine, or 1½ ounces of liquor. Lose weight, if needed. Being overweight increases your risk of certain health conditions. These include heart disease, high blood pressure, type 2 diabetes, and certain types of cancer. Protect your skin. Do not sunbathe or use tanning beds. Use sunscreen with a SPF 15 or higher. Apply sunscreen at least 15 minutes before you go outside. Reapply sunscreen every 2 hours. Wear protective clothing, hats, and sunglasses when you are outside. Drive safely. Always wear your seatbelt. Make sure everyone in your car wears a seatbelt. A seatbelt can save your life if you are in an accident. Do not use your cell phone when you are driving. This could distract you and cause an accident. Pull over if you need to make a call or send a text message. Practice safe sex. Use latex condoms if are sexually active and have more than one partner. Your healthcare provider may recommend screening tests for sexually transmitted infections (STIs). Wear helmets, lifejackets, and protective gear. Always wear a helmet when you ride a bike or motorcycle, go skiing, or play sports that could cause a head injury. Wear protective equipment when you play sports. Wear a lifejacket when you are on a boat or doing water sports.     © Copyright [employer] 2023 Information is for End User's use only and may not be sold, redistributed or otherwise used for commercial purposes. The above information is an  only. It is not intended as medical advice for individual conditions or treatments. Talk to your doctor, nurse or pharmacist before following any medical regimen to see if it is safe and effective for you.

## 2023-12-18 ENCOUNTER — OFFICE VISIT (OUTPATIENT)
Dept: OBGYN CLINIC | Facility: CLINIC | Age: 23
End: 2023-12-18

## 2023-12-18 VITALS
WEIGHT: 167.4 LBS | BODY MASS INDEX: 31.63 KG/M2 | SYSTOLIC BLOOD PRESSURE: 120 MMHG | DIASTOLIC BLOOD PRESSURE: 88 MMHG | HEART RATE: 82 BPM

## 2023-12-18 DIAGNOSIS — N93.0 POSTCOITAL BLEEDING: ICD-10-CM

## 2023-12-18 DIAGNOSIS — N94.6 DYSMENORRHEA: Primary | ICD-10-CM

## 2023-12-18 DIAGNOSIS — B37.9 YEAST INFECTION: ICD-10-CM

## 2023-12-18 DIAGNOSIS — Z11.3 SCREENING FOR STDS (SEXUALLY TRANSMITTED DISEASES): ICD-10-CM

## 2023-12-18 LAB
BV WHIFF TEST VAG QL: NEGATIVE
CLUE CELLS SPEC QL WET PREP: NEGATIVE
PH SMN: 4 [PH]
T VAGINALIS VAG QL WET PREP: NEGATIVE
YEAST VAG QL WET PREP: POSITIVE

## 2023-12-18 PROCEDURE — G0145 SCR C/V CYTO,THINLAYER,RESCR: HCPCS | Performed by: PHYSICIAN ASSISTANT

## 2023-12-18 PROCEDURE — 87591 N.GONORRHOEAE DNA AMP PROB: CPT | Performed by: PHYSICIAN ASSISTANT

## 2023-12-18 PROCEDURE — 87491 CHLMYD TRACH DNA AMP PROBE: CPT | Performed by: PHYSICIAN ASSISTANT

## 2023-12-18 RX ORDER — FLUCONAZOLE 150 MG/1
TABLET ORAL
Qty: 2 TABLET | Refills: 0 | Status: SHIPPED | OUTPATIENT
Start: 2023-12-18 | End: 2023-12-22

## 2023-12-18 RX ORDER — DROSPIRENONE AND ETHINYL ESTRADIOL 0.02-3(28)
1 KIT ORAL DAILY
Qty: 90 TABLET | Refills: 1 | Status: SHIPPED | OUTPATIENT
Start: 2023-12-18

## 2023-12-18 RX ORDER — MINOCYCLINE HYDROCHLORIDE 100 MG/1
100 TABLET ORAL 2 TIMES DAILY
COMMUNITY

## 2023-12-19 LAB
C TRACH DNA SPEC QL NAA+PROBE: NEGATIVE
N GONORRHOEA DNA SPEC QL NAA+PROBE: NEGATIVE

## 2023-12-22 LAB
LAB AP GYN PRIMARY INTERPRETATION: NORMAL
Lab: NORMAL

## 2024-01-11 ENCOUNTER — HOSPITAL ENCOUNTER (EMERGENCY)
Facility: HOSPITAL | Age: 24
Discharge: HOME | End: 2024-01-11
Attending: EMERGENCY MEDICINE
Payer: COMMERCIAL

## 2024-01-11 ENCOUNTER — APPOINTMENT (EMERGENCY)
Dept: RADIOLOGY | Facility: HOSPITAL | Age: 24
End: 2024-01-11
Payer: COMMERCIAL

## 2024-01-11 VITALS
TEMPERATURE: 97.4 F | HEART RATE: 88 BPM | SYSTOLIC BLOOD PRESSURE: 155 MMHG | RESPIRATION RATE: 18 BRPM | DIASTOLIC BLOOD PRESSURE: 95 MMHG | OXYGEN SATURATION: 100 %

## 2024-01-11 DIAGNOSIS — T14.8XXA ABRASION: ICD-10-CM
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
## 2024-01-11 PROCEDURE — 99284 EMERGENCY DEPT VISIT MOD MDM: CPT

## 2024-01-11 PROCEDURE — 99284 EMERGENCY DEPT VISIT MOD MDM: CPT | Performed by: EMERGENCY MEDICINE

## 2024-01-11 PROCEDURE — 73590 X-RAY EXAM OF LOWER LEG: CPT
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
## 2024-01-11 NOTE — Clinical Note
Griselda Romero was seen and treated in our emergency department on 1/11/2024.    No restrictions            Diagnosis:     Griselda  may return to school on return date, may return to work on return date.    She may return on this date: 01/13/2024         If you have any questions or concerns, please don't hesitate to call.      Annette Maria Palladino, DO    ______________________________           _______________          _______________  Hospital Representative                              Date                                Time
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
## 2024-02-01 ENCOUNTER — OFFICE VISIT (OUTPATIENT)
Dept: INTERNAL MEDICINE CLINIC | Facility: CLINIC | Age: 24
End: 2024-02-01

## 2024-02-01 VITALS
DIASTOLIC BLOOD PRESSURE: 72 MMHG | HEART RATE: 75 BPM | SYSTOLIC BLOOD PRESSURE: 105 MMHG | TEMPERATURE: 97.1 F | HEIGHT: 61 IN | WEIGHT: 168 LBS | BODY MASS INDEX: 31.72 KG/M2
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
## 2024-02-01 PROCEDURE — 99213 OFFICE O/P EST LOW 20 MIN: CPT | Performed by: STUDENT IN AN ORGANIZED HEALTH CARE EDUCATION/TRAINING PROGRAM
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
## 2024-03-08 ENCOUNTER — TELEPHONE (OUTPATIENT)
Dept: INTERNAL MEDICINE CLINIC | Facility: CLINIC | Age: 24
End: 2024-03-08

## 2024-03-08 NOTE — TELEPHONE ENCOUNTER
Folder Color- Red    Name of Form-  Dr. Luna    Form to be filled out by- [employer]     Form to be picked up by patient     Patient made aware of 10 business day policy.

## 2024-03-18 ENCOUNTER — OFFICE VISIT (OUTPATIENT)
Dept: INTERNAL MEDICINE CLINIC | Facility: CLINIC | Age: 24
End: 2024-03-18

## 2024-03-18 VITALS
WEIGHT: 168.8 LBS | HEART RATE: 88 BPM | TEMPERATURE: 98.6 F | DIASTOLIC BLOOD PRESSURE: 82 MMHG | OXYGEN SATURATION: 98 % | BODY MASS INDEX: 31.89 KG/M2 | SYSTOLIC BLOOD PRESSURE: 118 MMHG

## 2024-03-18 DIAGNOSIS — J02.9 SORE THROAT: Primary | ICD-10-CM

## 2024-03-18 DIAGNOSIS — R30.0 DYSURIA: ICD-10-CM

## 2024-03-18 LAB
SARS-COV-2 AG UPPER RESP QL IA: NEGATIVE
SL AMB  POCT GLUCOSE, UA: NEGATIVE
SL AMB LEUKOCYTE ESTERASE,UA: NEGATIVE
SL AMB POCT BILIRUBIN,UA: NEGATIVE
SL AMB POCT BLOOD,UA: NEGATIVE
SL AMB POCT CLARITY,UA: CLEAR
SL AMB POCT COLOR,UA: YELLOW
SL AMB POCT KETONES,UA: NEGATIVE
SL AMB POCT NITRITE,UA: NEGATIVE
SL AMB POCT PH,UA: 7
SL AMB POCT SPECIFIC GRAVITY,UA: 1.01
SL AMB POCT URINE PROTEIN: NEGATIVE
SL AMB POCT UROBILINOGEN: 0.2
VALID CONTROL: NORMAL

## 2024-03-18 PROCEDURE — 87811 SARS-COV-2 COVID19 W/OPTIC: CPT | Performed by: INTERNAL MEDICINE

## 2024-03-18 PROCEDURE — 81002 URINALYSIS NONAUTO W/O SCOPE: CPT | Performed by: INTERNAL MEDICINE

## 2024-03-18 PROCEDURE — 99213 OFFICE O/P EST LOW 20 MIN: CPT | Performed by: INTERNAL MEDICINE

## 2024-03-18 RX ORDER — NITROFURANTOIN 25; 75 MG/1; MG/1
100 CAPSULE ORAL 2 TIMES DAILY
Qty: 10 CAPSULE | Refills: 0 | Status: SHIPPED | OUTPATIENT
Start: 2024-03-18 | End: 2024-03-23

## 2024-03-18 RX ORDER — PHENAZOPYRIDINE HYDROCHLORIDE 95 MG/1
95 TABLET ORAL 3 TIMES DAILY PRN
Qty: 10 TABLET | Refills: 0 | Status: SHIPPED | OUTPATIENT
Start: 2024-03-18

## 2024-03-18 NOTE — PROGRESS NOTES
Name: Griselda Romero      : 2000      MRN: [id number]  Encounter Provider: Prachi Starr MD  Encounter Date: 3/18/2024   Encounter department: [facility]    Assessment & Plan     1. Sore throat  COVID-negative.  Differential includes postnasal drip given lack of rhinorrhea fever or chills.  May be viral URI that will get better with time.  No need for antibiotics.  -     POCT Rapid Covid Ag    2. Dysuria urine dip shows negative leukocytes and nitrites however similar to her UA in September.  Will still treat with nitrofurantoin and Pyridium.  -     POCT urine dip  -     nitrofurantoin (MACROBID) 100 mg capsule; Take 1 capsule (100 mg total) by mouth 2 (two) times a day for 5 days  -     phenazopyridine (PYRIDIUM) 95 MG tablet; Take 1 tablet (95 mg total) by mouth 3 (three) times a day as needed for bladder spasms           Subjective      Sore Throat   Pertinent negatives include no abdominal pain, coughing, ear pain, shortness of breath or vomiting.   Urinary Tract Infection   Associated symptoms include flank pain and frequency. Pertinent negatives include no chills, hematuria or vomiting.     24-year-old female with no significant medical history presenting with dysuria and sore throat.  Patient states her dysuria started on Saturday and notes a burning sensation while peeing, increased frequency and some back pain.  She states the symptoms are similar to in September when she had a UTI and was prescribed nitrofurantoin and Pyridium that helped with her symptoms.  She also notes yesterday a scratchy throat.  Denies rhinorrhea fever chills nausea vomiting chest pain shortness of breath or abdominal pain.  Denies sick contacts or recent travel.  Had COVID many years ago.          Review of Systems   Constitutional:  Negative for chills and fever.   HENT:  Positive for sore throat. Negative for ear pain.    Eyes:  Negative for pain and visual disturbance.    Respiratory:  Negative for cough and shortness of breath.    Cardiovascular:  Negative for chest pain and palpitations.   Gastrointestinal:  Negative for abdominal pain and vomiting.   Genitourinary:  Positive for dysuria, flank pain and frequency. Negative for hematuria.   Musculoskeletal:  Negative for arthralgias and back pain.   Skin:  Negative for color change and rash.   Neurological:  Negative for seizures and syncope.   All other systems reviewed and are negative.      Current Outpatient Medications on File Prior to Visit   Medication Sig    drospirenone-ethinyl estradiol (ERIKA) 3-0.02 MG per tablet Take 1 tablet by mouth daily (Patient not taking: Reported on 2/1/2024)    minocycline (DYNACIN) 100 MG tablet Take 100 mg by mouth 2 (two) times a day (Patient not taking: Reported on 2/1/2024)       Objective     /82 (BP Location: Right arm, Patient Position: Sitting, Cuff Size: Standard)   Pulse 88   Temp 98.6 °F (37 °C) (Temporal)   Wt 76.6 kg (168 lb 12.8 oz)   SpO2 98%   BMI 31.89 kg/m²     Physical Exam  Constitutional:       Appearance: Normal appearance.   HENT:      Nose: Nose normal.      Mouth/Throat:      Mouth: Mucous membranes are moist.      Pharynx: Oropharynx is clear. No oropharyngeal exudate or posterior oropharyngeal erythema.   Eyes:      Extraocular Movements: Extraocular movements intact.      Conjunctiva/sclera: Conjunctivae normal.   Cardiovascular:      Rate and Rhythm: Normal rate and regular rhythm.   Pulmonary:      Effort: Pulmonary effort is normal.      Breath sounds: Normal breath sounds.   Abdominal:      General: Bowel sounds are normal.      Palpations: Abdomen is soft.   Musculoskeletal:         General: Normal range of motion.      Cervical back: Normal range of motion.      Right lower leg: No edema.      Left lower leg: No edema.   Lymphadenopathy:      Cervical: No cervical adenopathy.   Neurological:      General: No focal deficit present.      Mental Status:  She is alert and oriented to person, place, and time.       Prachi Starr MD

## 2024-11-14 ENCOUNTER — TELEPHONE (OUTPATIENT)
Dept: INTERNAL MEDICINE CLINIC | Facility: CLINIC | Age: 24
End: 2024-11-14

## 2024-11-14 NOTE — TELEPHONE ENCOUNTER
Folder (To be completed by) -Attending    Name of Form -Permit     Color folder (Blue     Form to be Picked up by Patient     Patient was made aware of the 7-10 business day form policy.

## 2024-11-29 ENCOUNTER — PATIENT MESSAGE (OUTPATIENT)
Dept: OBGYN CLINIC | Facility: CLINIC | Age: 24
End: 2024-11-29

## 2024-11-29 NOTE — PATIENT COMMUNICATION
Patient returned call to r/s 1/29 appt. Encouraged to schedule with established provider, Alea Ward for fertility consult however pt requested specific dates & no availability with Alea on 1/13, 1/21 or 1/29.   Pt tentatively scheduled with Christy (only provider with availability on specified dates). Please reach out if able to r/s with Alea on requested dates.

## 2025-01-22 ENCOUNTER — RA CDI HCC (OUTPATIENT)
Dept: OTHER | Facility: HOSPITAL | Age: 25
End: 2025-01-22

## 2025-01-22 NOTE — PROGRESS NOTES
HCC coding opportunities       Chart reviewed, no opportunity found: CHART REVIEWED, NO OPPORTUNITY FOUND   This is a reminder to address (resolve/update/assess) ALL HCC (risk adjustment) codes as found on active problem list for 2025 as patient scores reset to zero SG.     Patients Insurance        Commercial Insurance: [employer]

## 2025-01-29 ENCOUNTER — TELEPHONE (OUTPATIENT)
Dept: INTERNAL MEDICINE CLINIC | Facility: CLINIC | Age: 25
End: 2025-01-29

## 2025-01-29 ENCOUNTER — OFFICE VISIT (OUTPATIENT)
Dept: OBGYN CLINIC | Facility: CLINIC | Age: 25
End: 2025-01-29
Payer: COMMERCIAL

## 2025-01-29 VITALS — DIASTOLIC BLOOD PRESSURE: 92 MMHG | WEIGHT: 175.6 LBS | SYSTOLIC BLOOD PRESSURE: 122 MMHG | BODY MASS INDEX: 33.18 KG/M2

## 2025-01-29 DIAGNOSIS — Z31.89 ENCOUNTER FOR FERTILITY PLANNING: Primary | ICD-10-CM

## 2025-01-29 PROCEDURE — 99213 OFFICE O/P EST LOW 20 MIN: CPT | Performed by: PHYSICIAN ASSISTANT

## 2025-01-29 NOTE — PROGRESS NOTES
.Griselda Romero  2000    S:  24 y.o. female with concern for infertility. She and boyfriend have been having unprotected sex for the past year without accidental pregnancy. They are not actively TTC but not trying to prevent pregnancy. Her periods are regular, q 25-27 days. Occasional 28 and 30 day cycle. Periods are light to moderate and last 5 days. Cyclic sx are manageable.     They have IC usually about 3 times in a day, maybe once per week. She does not track ovulation and has never looked at when they are having sex in relation to IC.     She is inconsistent with exercise. He is a postman and goes to gym occasionally. Endorse balanced diet. Neither smokes/uses tobacco products. Both deny drug use and alcohol intake. Neither has previously conceived. No FH of premature ovarian failure.     Review of Systems   Constitutional: Negative   Gastrointestinal: Negative  Genitourinary: Negative    O:  /92 (BP Location: Left arm, Patient Position: Sitting, Cuff Size: Standard)   Wt 79.7 kg (175 lb 9.6 oz)   LMP 01/16/2025 (Exact Date)   BMI 33.18 kg/m²   She appears well and is in no distress  Normocephalic, atraumatic.   Normal respiratory effort  No focal neurological deficits.   Normal mood, affect, and behavior.       A/P:  Diagnoses and all orders for this visit:    Encounter for fertility planning    Discussed option to check hormones, however, she is not actively interested in TTC and as her periods are regular, declines this.   We discussed tracking menses in reji, timed intercourse, and how sperm needs to be present in the female genital tract in order for fertilization to occur.   Encouraged when she is ready to TTC, to use ovulation prediction. If not successful after 6 months, RTO to trouble shoot. We discussed healthy habits for preconception as well as pregnancy including a well-rounded diet and moderate exercise.  Encouraged prenatal vitamin. She declines contraception at this time as she  would be open to pregnancy if this happened.

## 2025-02-05 ENCOUNTER — OFFICE VISIT (OUTPATIENT)
Dept: URGENT CARE | Age: 25
End: 2025-02-05
Payer: COMMERCIAL

## 2025-02-05 ENCOUNTER — APPOINTMENT (OUTPATIENT)
Dept: RADIOLOGY | Age: 25
End: 2025-02-05
Payer: COMMERCIAL

## 2025-02-05 VITALS
DIASTOLIC BLOOD PRESSURE: 74 MMHG | RESPIRATION RATE: 16 BRPM | SYSTOLIC BLOOD PRESSURE: 124 MMHG | WEIGHT: 174 LBS | HEART RATE: 74 BPM | TEMPERATURE: 96.8 F | OXYGEN SATURATION: 99 % | BODY MASS INDEX: 32.88 KG/M2

## 2025-02-05 DIAGNOSIS — S69.92XA THUMB INJURY, LEFT, INITIAL ENCOUNTER: ICD-10-CM

## 2025-02-05 DIAGNOSIS — S69.92XA THUMB INJURY, LEFT, INITIAL ENCOUNTER: Primary | ICD-10-CM

## 2025-02-05 PROCEDURE — S9083 URGENT CARE CENTER GLOBAL: HCPCS

## 2025-02-05 PROCEDURE — G0383 LEV 4 HOSP TYPE B ED VISIT: HCPCS

## 2025-02-05 PROCEDURE — 73140 X-RAY EXAM OF FINGER(S): CPT

## 2025-02-05 NOTE — LETTER
February 5, 2025     Patient: Griselda Romero   YOB: 2000   Date of Visit: 2/5/2025       To Whom it May Concern:    Griselda Romero was seen in my clinic on 2/5/2025. She {Return to school/sport:[id number]}.    If you have any questions or concerns, please don't hesitate to call.         Sincerely,          THIAGO Mitchell        CC: No Recipients

## 2025-02-06 NOTE — PATIENT INSTRUCTIONS
Xray left thumb : Negative for acte fracture or dislocation identified by me.  If the radiologist has any positive findings, we will contact you.  You may monitor MyChart for your results.  Take 600 to 800 mg of ibuprofen every 8 hours.  Supplement with Tylenol 1000 mg every 8 hours as needed, alternating every 4 hours with ibuprofen.  Ice 20 minutes on 20 minutes off.  Elevate above the level of the heart whenever not in use.  If symptoms or not improved in 3 to 5 days follow-up with PCP or Ortho.  If symptoms worsen or new symptoms develop report to the emergency room immediately.

## 2025-02-06 NOTE — PROGRESS NOTES
[facility]        NAME: Griselda Romero is a 24 y.o. female  : 2000    MRN: [id number]  DATE: 2025  TIME: 9:00 PM    Assessment and Plan   Thumb injury, left, initial encounter [S69.92XA]  1. Thumb injury, left, initial encounter  XR thumb left first digit-min 2v    Ambulatory Referral to Orthopedic Surgery          Xray left thumb: Negative for acte fracture or dislocation identified by me., pending final read.  Conservative pain management with tylenol/motrin  RICE  Follow up with PCP if no improvement in 5-7 days.      Patient Instructions       Follow up with PCP in 3-5 days.  Proceed to  ER if symptoms worsen.    If tests have been performed at [facility], our office will contact you with results if changes need to be made to the care plan discussed with you at the visit.  [facility]    Chief Complaint     Chief Complaint   Patient presents with   • Thumb Injury     Pt states she injured her left thumb on floor one month ago. Still painful and keeps bumping it.          History of Present Illness       Pt is a 24 year old female presenting with 1 months of left thumb pain after sustaining a fall.  She denies taking anything for the pain.  She denies numbness, tingling, or weakness to the left hand.  She is right hand dominant.  She has not taken anything for her symptoms.         Review of Systems   Review of Systems   Musculoskeletal:  Positive for arthralgias. Negative for joint swelling.         Current Medications     No current outpatient medications on file.    Current Allergies     Allergies as of 2025   • (No Known Allergies)            The following portions of the patient's history were reviewed and updated as appropriate: allergies, current medications, past family history, past medical history, past social history, past surgical history and problem list.     History reviewed. No pertinent past medical  history.    History reviewed. No pertinent surgical history.    Family History   Problem Relation Age of Onset   • Ovarian cysts Mother    • No Known Problems Father    • Breast cancer Neg Hx    • Cervical cancer Neg Hx    • Colon cancer Neg Hx    • Endometrial cancer Neg Hx    • Ovarian cancer Neg Hx    • Uterine cancer Neg Hx          Medications have been verified.        Objective   /74   Pulse 74   Temp (!) 96.8 °F (36 °C)   Resp 16   Wt 78.9 kg (174 lb)   LMP 01/16/2025 (Exact Date)   SpO2 99%   BMI 32.88 kg/m²   Patient's last menstrual period was 01/16/2025 (exact date).       Physical Exam     Physical Exam  Vitals and nursing note reviewed.   Constitutional:       Appearance: Normal appearance. She is normal weight.   HENT:      Head: Normocephalic.   Cardiovascular:      Pulses: Normal pulses.   Musculoskeletal:      Left hand: Tenderness present. No swelling, deformity, lacerations or bony tenderness. Normal range of motion. Normal strength. Normal sensation. There is no disruption of two-point discrimination. Normal capillary refill. Normal pulse.        Hands:    Neurological:      Mental Status: She is alert.

## 2025-02-19 ENCOUNTER — OFFICE VISIT (OUTPATIENT)
Dept: URGENT CARE | Age: 25
End: 2025-02-19
Payer: COMMERCIAL

## 2025-02-19 VITALS
RESPIRATION RATE: 18 BRPM | SYSTOLIC BLOOD PRESSURE: 118 MMHG | DIASTOLIC BLOOD PRESSURE: 74 MMHG | HEIGHT: 61 IN | HEART RATE: 82 BPM | TEMPERATURE: 96.8 F | WEIGHT: 177 LBS | BODY MASS INDEX: 33.42 KG/M2 | OXYGEN SATURATION: 99 %

## 2025-02-19 DIAGNOSIS — R59.0 POSTERIOR AURICULAR LYMPHADENOPATHY: Primary | ICD-10-CM

## 2025-02-19 PROCEDURE — G0383 LEV 4 HOSP TYPE B ED VISIT: HCPCS

## 2025-02-19 PROCEDURE — S9083 URGENT CARE CENTER GLOBAL: HCPCS

## 2025-02-20 NOTE — PATIENT INSTRUCTIONS
Warm compresses to lymph nodes.  Gentle massages to the area.  Tylenol or motrin for pain or fever.   Monitor for worsening symptoms.

## 2025-02-20 NOTE — PROGRESS NOTES
[facility]        NAME: Griselda Romero is a 24 y.o. female  : 2000    MRN: [id number]  DATE: 2025  TIME: 7:48 PM    Assessment and Plan   Posterior auricular lymphadenopathy [R59.0]  1. Posterior auricular lymphadenopathy          Warm compresses to lymph nodes.  Gentle massages to the area.  Tylenol or motrin for pain or fever.   Monitor for worsening symptoms.     Patient Instructions       Follow up with PCP in 3-5 days.  Proceed to  ER if symptoms worsen.    If tests have been performed at [facility], our office will contact you with results if changes need to be made to the care plan discussed with you at the visit.  You can review your full results on [facility].    Chief Complaint     Chief Complaint   Patient presents with   • Abscess     Pt states she developed bump behind left ear on .          History of Present Illness       Pt is a 24 year female presenting with a lump behind right ear. She reports mild sore throat.   She denies fever, cough, congestion, runny nose, drainage from site, changes in hear, headaches, dizziness, nausea, or changes in hearing. She has not taken anything for her symptoms.      Abscess  Pertinent negatives include no chills, diaphoresis, fever or rash.       Review of Systems   Review of Systems   Constitutional:  Negative for chills, diaphoresis and fever.   Skin:  Negative for rash.         Current Medications     No current outpatient medications on file.    Current Allergies     Allergies as of 2025   • (No Known Allergies)            The following portions of the patient's history were reviewed and updated as appropriate: allergies, current medications, past family history, past medical history, past social history, past surgical history and problem list.     No past medical history on file.    No past surgical history on file.    Family History   Problem Relation Age of Onset   • Ovarian cysts Mother    • No Known  "Problems Father    • Breast cancer Neg Hx    • Cervical cancer Neg Hx    • Colon cancer Neg Hx    • Endometrial cancer Neg Hx    • Ovarian cancer Neg Hx    • Uterine cancer Neg Hx          Medications have been verified.        Objective   /74   Pulse 82   Temp (!) 96.8 °F (36 °C)   Resp 18   Ht 5' 1\" (1.549 m)   Wt 80.3 kg (177 lb)   LMP 02/11/2025 (Approximate)   SpO2 99%   BMI 33.44 kg/m²   Patient's last menstrual period was 02/11/2025 (approximate).       Physical Exam     Physical Exam  Vitals and nursing note reviewed.   Constitutional:       General: She is not in acute distress.     Appearance: Normal appearance. She is normal weight.   HENT:      Head: Normocephalic.      Right Ear: Tympanic membrane normal. Tympanic membrane is not injected, perforated or erythematous.      Left Ear: Tympanic membrane normal. Swelling present.  No middle ear effusion. Tympanic membrane is not injected, perforated or erythematous.      Ears:      Comments: Posterior auricular lymphodes swelling     Nose: No congestion or rhinorrhea.      Mouth/Throat:      Pharynx: Posterior oropharyngeal erythema present.   Eyes:      Extraocular Movements: Extraocular movements intact.   Cardiovascular:      Rate and Rhythm: Normal rate and regular rhythm.      Pulses: Normal pulses.      Heart sounds: Normal heart sounds.   Pulmonary:      Effort: Pulmonary effort is normal.      Breath sounds: Normal breath sounds.   Abdominal:      General: Bowel sounds are normal.      Palpations: Abdomen is soft.   Lymphadenopathy:      Cervical: Cervical adenopathy present.   Skin:     General: Skin is warm.      Capillary Refill: Capillary refill takes less than 2 seconds.   Neurological:      General: No focal deficit present.      Mental Status: She is alert.                   "

## 2025-04-08 ENCOUNTER — OFFICE VISIT (OUTPATIENT)
Dept: INTERNAL MEDICINE CLINIC | Facility: CLINIC | Age: 25
End: 2025-04-08

## 2025-04-08 VITALS
TEMPERATURE: 97.9 F | OXYGEN SATURATION: 99 % | DIASTOLIC BLOOD PRESSURE: 76 MMHG | BODY MASS INDEX: 34.01 KG/M2 | SYSTOLIC BLOOD PRESSURE: 115 MMHG | HEART RATE: 76 BPM | WEIGHT: 180 LBS

## 2025-04-08 DIAGNOSIS — M25.512 ACUTE PAIN OF BOTH SHOULDERS: Primary | ICD-10-CM

## 2025-04-08 DIAGNOSIS — M25.511 ACUTE PAIN OF BOTH SHOULDERS: Primary | ICD-10-CM

## 2025-04-08 PROCEDURE — 99213 OFFICE O/P EST LOW 20 MIN: CPT | Performed by: STUDENT IN AN ORGANIZED HEALTH CARE EDUCATION/TRAINING PROGRAM

## 2025-04-08 NOTE — PROGRESS NOTES
Name: Griselda Romero      : 2000      MRN: [id number]  Encounter Provider: Prachi Starr MD  Encounter Date: 2025   Encounter department: [facility] [place]  :  Assessment & Plan  Acute pain of both shoulders  On exam both shoulders seem to pop out of socket and back into socket when abducting the shoulders.  Has had history of dislocations of the shoulders in the past.  Does note that sometimes she has laxity in her shoulder joints and has noticed some laxity in her hip joints as well but no other joints pop like her shoulder does.  Will get x-rays of the bilateral shoulders and vitamin D level.  Will start on Voltaren gel.  May be concerning for Christopher-Danlos given the joint laxity.  If shoulder x-rays show some anatomical issues then will have patient see orthopedic surgery.  If x-rays are normal we will have patient undergo physical therapy  Orders:    Diclofenac Sodium (VOLTAREN) 1 %; Apply 2 g topically 4 (four) times a day for 10 days    XR shoulder 2+ vw left; Future    XR shoulder 2+ vw right; Future    Vitamin D 25 hydroxy; Future    Ambulatory Referral to Physical Therapy; Future           History of Present Illness   HPI  25-year-old female with no significant medical history presenting with bilateral shoulder pain.  Patient states that over the last month she started to have shoulder pain in her left shoulder but over the last week has started to have pain in her right shoulder as well.  She works as a  and denies any heavy lifting in her job or trauma to the area.  She does states that she has bad posture while sitting but notes that she does not have any pain in her neck or trapezius region.  She does note that she has joint laxity and has had dislocations of her shoulder in the past when she was a child.  She notes that whenever she moves her shoulders up and down sometimes they pop.  Denies any other symptoms.  Denies family history of  rheumatoid arthritis or Christopher-Danlos.       Review of Systems   Constitutional:  Negative for chills and fever.   HENT:  Negative for ear pain and sore throat.    Eyes:  Negative for pain and visual disturbance.   Respiratory:  Negative for cough and shortness of breath.    Cardiovascular:  Negative for chest pain and palpitations.   Gastrointestinal:  Negative for abdominal pain and vomiting.   Genitourinary:  Negative for dysuria and hematuria.   Musculoskeletal:  Negative for arthralgias and back pain.   Skin:  Negative for color change and rash.   Neurological:  Negative for seizures and syncope.   All other systems reviewed and are negative.      Objective   /76 (BP Location: Left arm, Patient Position: Sitting, Cuff Size: Adult)   Pulse 76   Temp 97.9 °F (36.6 °C) (Temporal)   Wt 81.6 kg (180 lb)   SpO2 99%   BMI 34.01 kg/m²      Physical Exam  Vitals and nursing note reviewed.   Constitutional:       General: She is not in acute distress.     Appearance: She is well-developed.   HENT:      Head: Normocephalic and atraumatic.   Eyes:      Conjunctiva/sclera: Conjunctivae normal.   Cardiovascular:      Rate and Rhythm: Normal rate and regular rhythm.      Heart sounds: No murmur heard.  Pulmonary:      Effort: Pulmonary effort is normal. No respiratory distress.      Breath sounds: Normal breath sounds.   Abdominal:      Palpations: Abdomen is soft.      Tenderness: There is no abdominal tenderness.   Musculoskeletal:         General: No swelling.      Cervical back: Neck supple.   Skin:     General: Skin is warm and dry.      Capillary Refill: Capillary refill takes less than 2 seconds.   Neurological:      Mental Status: She is alert.   Psychiatric:         Mood and Affect: Mood normal.

## 2025-04-09 ENCOUNTER — RESULTS FOLLOW-UP (OUTPATIENT)
Dept: INTERNAL MEDICINE CLINIC | Facility: CLINIC | Age: 25
End: 2025-04-09

## 2025-04-09 ENCOUNTER — APPOINTMENT (OUTPATIENT)
Dept: LAB | Age: 25
End: 2025-04-09
Payer: COMMERCIAL

## 2025-04-09 ENCOUNTER — APPOINTMENT (OUTPATIENT)
Dept: RADIOLOGY | Age: 25
End: 2025-04-09
Payer: COMMERCIAL

## 2025-04-09 DIAGNOSIS — M25.511 ACUTE PAIN OF BOTH SHOULDERS: ICD-10-CM

## 2025-04-09 DIAGNOSIS — M25.512 ACUTE PAIN OF BOTH SHOULDERS: ICD-10-CM

## 2025-04-09 LAB — 25(OH)D3 SERPL-MCNC: 21.8 NG/ML (ref 30–100)

## 2025-04-09 PROCEDURE — 82306 VITAMIN D 25 HYDROXY: CPT

## 2025-04-09 PROCEDURE — 36415 COLL VENOUS BLD VENIPUNCTURE: CPT

## 2025-04-09 PROCEDURE — 73030 X-RAY EXAM OF SHOULDER: CPT

## 2025-04-15 ENCOUNTER — EVALUATION (OUTPATIENT)
Dept: PHYSICAL THERAPY | Age: 25
End: 2025-04-15
Payer: COMMERCIAL

## 2025-04-15 DIAGNOSIS — M25.512 ACUTE PAIN OF BOTH SHOULDERS: ICD-10-CM

## 2025-04-15 DIAGNOSIS — M25.511 ACUTE PAIN OF BOTH SHOULDERS: ICD-10-CM

## 2025-04-15 PROCEDURE — 97110 THERAPEUTIC EXERCISES: CPT

## 2025-04-15 PROCEDURE — 97161 PT EVAL LOW COMPLEX 20 MIN: CPT

## 2025-04-15 NOTE — PROGRESS NOTES
PT Evaluation     Today's date: 4/15/2025  Patient name: Griselda Romero  : 2000  MRN: [id number]  Referring provider: Pina Downing DO  Dx:   Encounter Diagnosis     ICD-10-CM    1. Acute pain of both shoulders  M25.511 Ambulatory Referral to Physical Therapy    M25.512           Start Time: 1700  Stop Time: 1800  Total time in clinic (min): 60 minutes    Assessment  Impairments: abnormal or restricted ROM, abnormal movement, activity intolerance, impaired physical strength, pain with function and activity limitations    Assessment details: Patient presents to PT with c/c B/L shoulder pain. Patient demonstrates joint laxity with Beighton scale and possesses B/L UE ROM WFL. She possesses gross functional strength, but poor posture and poor scapular stabilization cause excess scapular elevation with OH activity. Patient educated and demonstrates understanding of HEP and postural corrections, especially at work. Patient is a good candidate and will benefit from skilled PT to address B/L shoulder pain.     Goals  SHORT TERM:  Patient will complete phase I of HEP.   Patient will complete 10s full plank.   Patient will improve strength by 1/2 grade.     LONG TERM:   Patient will complete phase II of HEP.  Patient will complete 20s full plank.   Patient will improve strength to return to functional activities.     Plan  Patient would benefit from: skilled physical therapy    Planned therapy interventions: balance/weight bearing training, functional ROM exercises, home exercise program, therapeutic exercise, therapeutic activities, strengthening, stretching, patient/caregiver education, neuromuscular re-education, manual therapy and joint mobilization    Frequency: 1-2x week  Duration in weeks: 8  Plan of Care beginning date: 4/15/2025  Plan of Care expiration date: 6/10/2025  Treatment plan discussed with: patient  Plan details: Patient will complete skilled PT, including HEP, therapeutic exercise, therapeutic  activity, neuromuscular reduction, and manual therapy, 1-2/week for 8 weeks to address B/L shoulder pain.       Subjective Evaluation    History of Present Illness  Mechanism of injury: Patient reports to PT with c/c B/L shoulder pain. She recalls pain started insidiously 3 weeks ago in the L shoulder and then the R soon after. She describes pain as constant dull and achyness around the shoulders and down the upper arms. She also describes popping sensation in both shoulder with OH - sometimes painful. She says her L shoulder is more bothersome than the R. Pain is made worse by side sleeping and carrying heavy items such as groceries. Change in position and rest alleviate pain. She works as a  in a dermatology office. She is not limited in her tasks at work, although she recalls a day last week when she shoulder hurt too much to reach forward on the desk. She says since this past weekend her pain is beginning to resolve. She says she isn't doing anything differently, just avoiding aggravating positions and activities. She is I with ADLs. She would like to get back into lifting and weight training.         Patient Goals  Patient goals for therapy: decreased pain, increased strength, independence with ADLs/IADLs and return to sport/leisure activities  Patient goal: weight training  Pain  Current pain ratin  At best pain ratin  At worst pain ratin  Quality: dull ache  Relieving factors: change in position  Aggravating factors: overhead activity (carrying heavy items)  Symptom course: improving.        Objective     Postural Observations  Seated posture: poor  Standing posture: poor      Palpation   Left   No palpable tenderness to the biceps.     Tenderness     Left Shoulder   No tenderness in the AC joint and bicipital groove.     Right Shoulder  No tenderness in the AC joint and bicipital groove.     Active Range of Motion   Cervical/Thoracic Spine       Cervical    Flexion:  WFL  Extension:   WFL  Left lateral flexion:  WFL  Right lateral flexion:  WFL  Left rotation:  WFL  Right rotation:  WFL    Thoracic    Flexion:  WFL  Extension:  WFL  Left Shoulder   Normal active range of motion    Right Shoulder   Normal active range of motion    Additional Active Range of Motion Details  Reproduced R shoulder popping sensation only with active ABD above 90 degrees.     Scapular Mobility     Right Shoulder   Scapular Mobility with Shoulder to 90° FF   Scapular elevation: minimal    Scapular Mobility beyond 90° FF   Scapular elevation: minimal    Strength/Myotome Testing     Left Shoulder     Planes of Motion   Flexion: 4   Extension: 4-   Abduction: 4-   External rotation at 0°: 4   Internal rotation at 0°: 4     Isolated Muscles   Latissimus: 4-   Lower trapezius: 4-   Middle deltoid: 4-   Middle trapezius: 4-   Rhomboids: 4-   Serratus anterior: 4   Upper trapezius: 4     Right Shoulder     Planes of Motion   Flexion: 4   Extension: 4-   Abduction: 4-   External rotation at 0°: 4   Internal rotation at 0°: 4     Isolated Muscles   Latissimus: 4-   Lower trapezius: 4-   Middle deltoid: 4-   Middle trapezius: 4-   Rhomboids: 4-   Serratus anterior: 4   Upper trapezius: 4     Left Elbow   Flexion: 4  Extension: 4    Right Elbow   Flexion: 4  Extension: 4    Left Wrist/Hand   Wrist extension: 4-  Wrist flexion: 4-  Thumb extension: 4-    Right Wrist/Hand   Wrist extension: 4-  Wrist flexion: 4-  Thumb extension: 4-    Tests     Left Shoulder   Negative anterior load and shift, active compression ([employer]), apprehension, belly press, clunk, crank, empty can, full can, Hawkin's, horn blower, lift-off, Neer's, painful arc, ULTT1, ULTT2, ULTT3, ULTT4, posterior apprehension  and bicep load .     Right Shoulder   Negative anterior load and shift, active compression ([employer]), apprehension, belly press, clunk, crank, empty can, full can, Hawkin's, horn blower, lift-off, Neer's, painful arc, ULTT1, ULTT2, ULTT3, ULTT4,  posterior apprehension  and bicep load test positive.     Functional Assessment        Comments  Beighton Score: 9/9              Precautions: NA      Manuals 4/15                                                                Neuro Re-Ed                                                                                                        Ther Ex             HEP 20min                                                                                                       Ther Activity                                       Gait Training                                       Modalities                                          Access Code: [email or web address]  URL: [email or web address]  Date: 04/15/2025  Prepared by: Idania Bo    Exercises  - Shoulder External Rotation and Scapular Retraction with Resistance  - 1 x daily - 7 x weekly - 3 sets - 10 reps  - Shoulder extension with resistance - Neutral  - 1 x daily - 7 x weekly - 3 sets - 10 reps  - Seated Thoracic Extension AROM  - 1 x daily - 7 x weekly - 3 sets - 10 reps  - Bird Dog  - 1 x daily - 7 x weekly - 3 sets - 10 reps  - Full Plank with Scapular Protraction Retraction AROM  - 1 x daily - 7 x weekly - 3 sets - 10 reps

## 2025-04-23 ENCOUNTER — APPOINTMENT (OUTPATIENT)
Dept: PHYSICAL THERAPY | Age: 25
End: 2025-04-23
Attending: STUDENT IN AN ORGANIZED HEALTH CARE EDUCATION/TRAINING PROGRAM
Payer: COMMERCIAL

## 2025-08-06 ENCOUNTER — TELEPHONE (OUTPATIENT)
Dept: INTERNAL MEDICINE CLINIC | Facility: CLINIC | Age: 25
End: 2025-08-06